# Patient Record
Sex: FEMALE | Race: WHITE | NOT HISPANIC OR LATINO | Employment: UNEMPLOYED | URBAN - METROPOLITAN AREA
[De-identification: names, ages, dates, MRNs, and addresses within clinical notes are randomized per-mention and may not be internally consistent; named-entity substitution may affect disease eponyms.]

---

## 2019-07-25 ENCOUNTER — HOSPITAL ENCOUNTER (OUTPATIENT)
Dept: GENERAL RADIOLOGY | Facility: HOSPITAL | Age: 29
Discharge: HOME | End: 2019-07-25
Attending: OBSTETRICS & GYNECOLOGY

## 2020-02-21 ENCOUNTER — HOSPITAL ENCOUNTER (OUTPATIENT)
Dept: LABOR AND DELIVERY | Facility: HOSPITAL | Age: 30
Discharge: HOME | End: 2020-02-21
Attending: OBSTETRICS & GYNECOLOGY

## 2020-02-22 ENCOUNTER — HOSPITAL ENCOUNTER (OUTPATIENT)
Dept: LABOR AND DELIVERY | Facility: HOSPITAL | Age: 30
Discharge: HOME | End: 2020-02-22
Attending: OBSTETRICS & GYNECOLOGY

## 2020-05-08 ENCOUNTER — HOSPITAL ENCOUNTER (OUTPATIENT)
Dept: ULTRASOUND IMAGING | Facility: HOSPITAL | Age: 30
Discharge: HOME | End: 2020-05-08
Attending: OBSTETRICS & GYNECOLOGY

## 2020-05-15 ENCOUNTER — HOSPITAL ENCOUNTER (OUTPATIENT)
Dept: ULTRASOUND IMAGING | Facility: HOSPITAL | Age: 30
Discharge: HOME | End: 2020-05-15
Attending: OBSTETRICS & GYNECOLOGY

## 2020-05-18 ENCOUNTER — OFFICE VISIT CONVERTED (OUTPATIENT)
Dept: SURGERY | Facility: CLINIC | Age: 30
End: 2020-05-18
Attending: SURGERY

## 2020-05-19 ENCOUNTER — HOSPITAL ENCOUNTER (OUTPATIENT)
Dept: PERIOP | Facility: HOSPITAL | Age: 30
Setting detail: HOSPITAL OUTPATIENT SURGERY
Discharge: HOME | End: 2020-05-19
Attending: SURGERY

## 2020-05-19 LAB — HCG UR QL: NEGATIVE

## 2020-05-21 LAB
BACTERIA SPEC AEROBE CULT: NORMAL
BACTERIA SPEC ANAEROBE CULT: NORMAL

## 2020-05-28 ENCOUNTER — OFFICE VISIT CONVERTED (OUTPATIENT)
Dept: SURGERY | Facility: CLINIC | Age: 30
End: 2020-05-28
Attending: SURGERY

## 2020-06-04 ENCOUNTER — HOSPITAL ENCOUNTER (OUTPATIENT)
Dept: SURGERY | Facility: CLINIC | Age: 30
Discharge: HOME | End: 2020-06-04
Attending: NURSE PRACTITIONER

## 2020-06-04 ENCOUNTER — OFFICE VISIT CONVERTED (OUTPATIENT)
Dept: SURGERY | Facility: CLINIC | Age: 30
End: 2020-06-04
Attending: NURSE PRACTITIONER

## 2020-06-06 LAB — BACTERIA SPEC AEROBE CULT: NORMAL

## 2020-06-11 ENCOUNTER — OFFICE VISIT CONVERTED (OUTPATIENT)
Dept: SURGERY | Facility: CLINIC | Age: 30
End: 2020-06-11
Attending: NURSE PRACTITIONER

## 2020-06-25 ENCOUNTER — OFFICE VISIT CONVERTED (OUTPATIENT)
Dept: SURGERY | Facility: CLINIC | Age: 30
End: 2020-06-25
Attending: NURSE PRACTITIONER

## 2020-06-25 ENCOUNTER — HOSPITAL ENCOUNTER (OUTPATIENT)
Dept: SURGERY | Facility: CLINIC | Age: 30
Discharge: HOME | End: 2020-06-25
Attending: NURSE PRACTITIONER

## 2020-06-29 LAB
BACTERIA SPEC AEROBE CULT: ABNORMAL
CIPROFLOXACIN SUSC ISLT: <=0.5
CLINDAMYCIN SUSC ISLT: 0.25
DAPTOMYCIN SUSC ISLT: 0.5
DOXYCYCLINE SUSC ISLT: <=0.5
ERYTHROMYCIN SUSC ISLT: <=0.25
GENTAMICIN SUSC ISLT: <=0.5
LEVOFLOXACIN SUSC ISLT: <=0.12
OXACILLIN SUSC ISLT: <=0.25
RIFAMPIN SUSC ISLT: <=0.5
TETRACYCLINE SUSC ISLT: <=1
TIGECYCLINE SUSC ISLT: <=0.12
TMP SMX SUSC ISLT: <=10
VANCOMYCIN SUSC ISLT: 1

## 2020-07-02 ENCOUNTER — OFFICE VISIT CONVERTED (OUTPATIENT)
Dept: SURGERY | Facility: CLINIC | Age: 30
End: 2020-07-02
Attending: SURGERY

## 2020-07-16 ENCOUNTER — CONVERSION ENCOUNTER (OUTPATIENT)
Dept: SURGERY | Facility: CLINIC | Age: 30
End: 2020-07-16

## 2020-07-16 ENCOUNTER — OFFICE VISIT CONVERTED (OUTPATIENT)
Dept: SURGERY | Facility: CLINIC | Age: 30
End: 2020-07-16
Attending: SURGERY

## 2020-12-09 LAB
EXTERNAL HEPATITIS B SURFACE ANTIGEN: NEGATIVE
EXTERNAL HEPATITIS C, RNA QUANT PCR: 0
EXTERNAL RUBELLA QUALITATIVE: NORMAL
EXTERNAL SYPHILIS RPR SCREEN: NEGATIVE

## 2021-03-24 LAB — EXTERNAL GTT 1 HOUR: 136

## 2021-05-10 NOTE — H&P
History and Physical      Patient Name: Dominique Thapa   Patient ID: [id number]   Sex: Female   YOB: 1990    Primary Care Provider: No PCP No PCP Other   Referring Provider: Supa Hickey MD    Visit Date: June 25, 2020    Provider: CLARIBEL Morley   Location: [facility]   Location Address: [address] [place] [address]  [id number]   Location Phone: [phone]          Chief Complaint  · Follow Up      History Of Present Illness  Dominique Thapa is a 30 year old /White female who is here to follow up wound check.      Patient presents today for follow-up visit after undergoing an I&D of the right breast on 5/19/2020 performed by Dr. Henrietta Liu for an abscess.  Patient reports that she is still packing her wound.  Is concerned with some greenish-yellowish drainage.  Denies any fever or chills.       Past Medical History  Disease Name Date Onset Notes   *No Pertinent Past Medical History --  --          Past Surgical History  Procedure Name Date Notes   Incision and drainage abscess --  --          Allergy List  Allergen Name Date Reaction Notes   NO KNOWN DRUG ALLERGIES --  --  --        Allergies Reconciled  Family Medical History  Disease Name Relative/Age Notes   Diabetes, unspecified type Grandmother (maternal)/   Grandmother (maternal)   Family history of breast cancer  Aunt/30s         Social History  Finding Status Start/Stop Quantity Notes   Tobacco Never --/-- --  --          Review of Systems  · Constitutional  o Denies  o : chills, fever  · Eyes  o Denies  o : yellowish discoloration of eyes  · HENT  o Denies  o : difficulty swallowing  · Breasts  o Admits  o : tenderness  o Denies  o : abnormal changes in breast size, additional breast symptoms except as noted in the HPI  · Cardiovascular  o Denies  o : chest pain on exertion  · Respiratory  o Denies  o : shortness of breath  · Gastrointestinal  o Denies  o : nausea, vomiting, diarrhea,  "constipation  · Genitourinary  o Denies  o : abnormal color of urine  · Integument  o Denies  o : rash  · Neurologic  o Denies  o : tingling or numbness  · Musculoskeletal  o Denies  o : joint pain  · Endocrine  o Denies  o : weight gain, weight loss      Vitals  Date Time BP Position Site L\R Cuff Size HR RR TEMP (F) WT  HT  BMI kg/m2 BSA m2 O2 Sat        06/25/2020 09:21 AM       12  142lbs 4oz 5'  6\" 22.96 1.73           Physical Examination  · Constitutional  o Appearance  o : well developed, well-nourished, patient in no apparent distress  · Head and Face  o Head  o :   § Inspection  § : atraumatic, normocephalic  o Face  o :   § Inspection  § : no facial lesions  · Eyes  o Conjunctivae  o : conjunctivae normal  o Sclerae  o : sclerae white  · Neck  o Inspection/Palpation  o : normal appearance, no masses or tenderness, trachea midline  · Respiratory  o Respiratory Effort  o : breathing unlabored  · Skin and Subcutaneous Tissue  o General Inspection  o : no lesions present, no areas of discoloration, skin turgor normal, texture normal  · Neurologic  o Mental Status Examination  o :   § Orientation  § : grossly oriented to person, place and time  § Attention  § : attention normal, concentration abilities normal  § Fund of Knowledge  § : fund of knowledge within normal limits, patient aware of current events  o Gait and Station  o : normal gait, able to stand without difficulty  · Psychiatric  o Judgement and Insight  o : judgment and insight intact  o Mood and Affect  o : mood normal, affect appropriate     Right breast: Wound is 1 cm x 1.5 cm in depth.  Granulation tissue noted in the wound bed.  There was purulent drainage on packing that was removed.  Repacked wound today in clinic               Assessment  · Breast abscess     611.0/N61.1  · Encounter for surgical wound dressing change     V58.31/Z48.01    Problems Reconciled  Plan  · Orders  o Wound Culture with Sensitivities if indicated [facility] ([address]) - " 611.0/N61.1, V58.31/Z48.01 - 06/25/2020   right breast  · Medications  o Medications have been Reconciled  o Transition of Care or Provider Policy  · Instructions  o Continue with local wound care.  o follow-up with Dr. Liu in 1 week.  o Electronically Identified Patient Education Materials Provided Electronically  · Disposition  o Call or Return if symptoms worsen or persist.            Electronically Signed by: CLARIBEL Morley -Author on June 25, 2020 05:43:30 PM

## 2021-05-10 NOTE — H&P
History and Physical      Patient Name: Dominique Thapa   Patient ID: [id number]   Sex: Female   YOB: 1990    Primary Care Provider: No PCP No PCP Other   Referring Provider: Supa Hickey MD    Visit Date: July 2, 2020    Provider: Henrietta Liu MD   Location: [facility]   Location Address: [address] [place] [address]  [id number]   Location Phone: [phone]          Chief Complaint  · Follow Up Surgery      History Of Present Illness  Dominique Thapa is a 30 year old /White female who is here today for follow up of: I and D breast abscess.   She is doing well-status post surgery. Wound is almost completely closed.       Past Medical History  *No Pertinent Past Medical History         Past Surgical History  Incision and drainage abscess         Allergy List  NO KNOWN DRUG ALLERGIES       Allergies Reconciled  Family Medical History  Diabetes, unspecified type; Family history of breast cancer         Social History  Tobacco (Never)         Review of Systems  · Constitutional  o Denies  o : fever, chills  · Eyes  o Denies  o : yellowish discoloration of the eyes, eye pain  · HENT  o Denies  o : difficulty swallowing, hoarseness  · Breasts  o * See HPI  · Cardiovascular  o Denies  o : chest pain on exertion, irregular heart beats  · Respiratory  o Denies  o : shortness of breath, cough  · Gastrointestinal  o Denies  o : nausea, vomiting, diarrhea, constipation  · Integument  o Admits  o : see HPI for surgical incision healing  · Neurologic  o Denies  o : tingling or numbness, loss of balance  · Musculoskeletal  o Denies  o : joint pain, back pain  · Endocrine  o Denies  o : weight gain, weight loss  · All Others Negative      Physical Examination  · Constitutional  o Appearance  o : well developed/well nourished patient in no apparent distress  · Respiratory  o Inspection of Chest  o : equal breaths bilaterally  · Gastrointestinal  o Abdominal  Examination  o : soft/nontender, nondistended, no organomegaly appreciated  · Post Surgical Incision  o Surgical wound  o : healing well, no erythema          Assessment  · Postoperative Exam Following Surgery     V67.00/Z09      Plan  · Medications  o Medications have been Reconciled  o Transition of Care or Provider Policy  · Instructions  o Return to office with any issues.  o F/U in 2 weeks.            Electronically Signed by: Henrietta Liu MD -Author on July 2, 2020 08:55:54 AM

## 2021-05-10 NOTE — H&P
History and Physical      Patient Name: Dominique Thapa   Patient ID: [id number]   Sex: Female   YOB: 1990    Primary Care Provider: No PCP No PCP Other   Referring Provider: Supa Hickey MD    Visit Date: June 11, 2020    Provider: CLARIBEL Morley   Location: [facility]   Location Address: [address] [place] [address]  [id number]   Location Phone: [phone]          Chief Complaint  · Follow Up      History Of Present Illness  Dominique Thapa is a 30 year old /White female who is here to follow up status post surgery.      Patient reports today for a follow-up visit for a wound check. Patient underwent an I&D of a right breast abscess with intraoperative ultrasound use on 5/19/2020 performed by Dr. Henrietta Liu. Reports that she is still doing dressing changes twice daily. Denies any fever or chills. Denies any pain.       Past Medical History  Disease Name Date Onset Notes   *No Pertinent Past Medical History --  --          Past Surgical History  Procedure Name Date Notes   Incision and drainage abscess --  --          Allergy List  Allergen Name Date Reaction Notes   NO KNOWN DRUG ALLERGIES --  --  --        Allergies Reconciled  Family Medical History  Disease Name Relative/Age Notes   Diabetes, unspecified type Grandmother (maternal)/   Grandmother (maternal)   Family history of breast cancer  Aunt/30s         Social History  Finding Status Start/Stop Quantity Notes   Tobacco Never --/-- --  --          Review of Systems  · Constitutional  o Denies  o : chills, fever  · Eyes  o Denies  o : yellowish discoloration of eyes  · HENT  o Denies  o : difficulty swallowing  · Breasts  o Admits  o : tenderness, additional breast symptoms except as noted in the HPI  o Denies  o : abnormal changes in breast size  · Cardiovascular  o Denies  o : chest pain on exertion  · Respiratory  o Denies  o : shortness of breath  · Gastrointestinal  o Denies  o :  "nausea, vomiting, diarrhea, constipation  · Genitourinary  o Denies  o : abnormal color of urine  · Integument  o Denies  o : rash  · Neurologic  o Denies  o : tingling or numbness  · Musculoskeletal  o Denies  o : joint pain  · Endocrine  o Denies  o : weight gain, weight loss      Vitals  Date Time BP Position Site L\R Cuff Size HR RR TEMP (F) WT  HT  BMI kg/m2 BSA m2 O2 Sat        06/11/2020 11:38 AM       12  143lbs 8oz 5'  6\" 23.16 1.74           Physical Examination  · Constitutional  o Appearance  o : well developed, well-nourished, patient in no apparent distress  · Head and Face  o Head  o :   § Inspection  § : atraumatic, normocephalic  o Face  o :   § Inspection  § : no facial lesions  · Eyes  o Conjunctivae  o : conjunctivae normal  o Sclerae  o : sclerae white  · Neck  o Inspection/Palpation  o : normal appearance, no masses or tenderness, trachea midline  · Respiratory  o Respiratory Effort  o : breathing unlabored  · Skin and Subcutaneous Tissue  o General Inspection  o : no lesions present, no areas of discoloration, skin turgor normal, texture normal  · Neurologic  o Mental Status Examination  o :   § Orientation  § : grossly oriented to person, place and time  § Attention  § : attention normal, concentration abilities normal  § Fund of Knowledge  § : fund of knowledge within normal limits, patient aware of current events  o Gait and Station  o : normal gait, able to stand without difficulty  · Psychiatric  o Judgement and Insight  o : judgment and insight intact  o Mood and Affect  o : mood normal, affect appropriate     Right breast: 1 cm x 1.5 cm depth wound. Granulation tissue noted in the wound bed.  Repacked today in clinic.           Assessment  · Abscess     682.9/L02.91  · Wound check, abscess     V58.89/Z51.89      Plan  · Medications  o Medications have been Reconciled  o Transition of Care or Provider Policy  · Instructions  o Follow up in 2 weeks.  o Continue with local wound " care.  o Electronically Identified Patient Education Materials Provided Electronically  · Disposition  o Call or Return if symptoms worsen or persist.            Electronically Signed by: CLARIBEL Morley -Author on June 11, 2020 07:36:25 PM

## 2021-05-10 NOTE — H&P
History and Physical      Patient Name: Dominique Thapa   Patient ID: [id number]   Sex: Female   YOB: 1990    Primary Care Provider: No PCP No PCP Other   Referring Provider: Supa Hickey MD    Visit Date: May 18, 2020    Provider: Henrietta Liu MD   Location: [facility]   Location Address: [address] [place] [address]  [id number]   Location Phone: [phone]          Chief Complaint  · Rigth breast abscess sp multiple attempted drainages  · Pre-Surgical History and Physical Examination for [facility]  · Consents for Surgery      History Of Present Illness  Dominique Thapa is a 30 year old /White female who is referred from Supa Hickey MD ; she had a baby in Feb and has been breast feeding and then developed a right breast abscess that has been treated with multiple drainages and has recurred every time over the days following the draianges. She would like to have it I and D'd in OR and does not want to attempt an additional needle drainage. The most recent ultrasound shows a 7.3 cm x 5.4 cm loculated fluid collection in the 10 oclock position about 6 cm FTN.             Past Medical History  *No Pertinent Past Medical History         Past Surgical History  *I have had no surgeries         Allergy List  NO KNOWN DRUG ALLERGIES       Allergies Reconciled  Family Medical History  Diabetes, unspecified type; Family history of breast cancer         Social History  Tobacco (Never)         Review of Systems  · Constitutional  o Denies  o : fever, chills  · Breasts  o * See HPI  · Cardiovascular  o Denies  o : chest pain, cardiac murmurs, irregular heart beats, dyspnea on exertion  · Integument  o Denies  o : rash, itching, new skin lesions  · Heme-Lymph  o Denies  o : easy bleeding, easy bruising, lymph node enlargement or tenderness      Vitals  Date Time BP Position Site L\R Cuff Size HR RR TEMP (F) WT  HT  BMI kg/m2 BSA m2 O2  "[facility]       05/18/2020 09:35 AM       12  144lbs 0oz 5'  6\" 23.24 1.74           Physical Examination  · Constitutional  o Appearance  o : A well-nourished, well-developed patient who ambulates without difficulty, Alert and Oriented X3.  · Respiratory  o Respiratory Effort  o : breathing unlabored  o Inspection of Chest  o : breaths equal bilaterally  · Breasts  o Inspection of Breasts  o : developmental state normal for age  o Palpation of Breasts, Axillae  o : no masses or tenderness noted on palpation on left, right breast is erythematous with induration          Assessment  · Breast abscess     611.0/N61.1  · Preoperative Examination     V72.83      Plan  · Orders  o General Surgery Order (GEN[facility]) - - 05/18/2020  o General Surgery Order (GENOR) - 611.0/N61.1 - 05/19/2020  · Medications  o Medications have been Reconciled  o Transition of Care or Provider Policy  · Instructions  o PLAN:  o Schedule operation  o Handouts Provided-Pre-Procedure Instructions including date and time and location of procedure.  o ****Surgical Orders****  o ****Patient Status****  o RISK AND BENEFITS:  o Consent for surgery: Given these options, the patient has verbally expressed an understanding of the risks of surgery and finds these risks acceptable. We will proceed with surgery as soon as possible.  o Possible risks, complications, benefits, and alternatives to surgical or invasive procedure have been explained to patient and/or legal guardian.  o O.R. PREP: Per protocol  o IV: Per Anesthesia  o Please sign permit for: Incision and drainage of right breast abscess  o Vancomycin 1 gram IV on call to OR.  o The above History and Physical Examination has been completed within 30 days of admission.            Electronically Signed by: Henrietta Liu MD -Author on May 18, 2020 09:52:16 AM  "

## 2021-05-10 NOTE — H&P
History and Physical      Patient Name: Dominique Thapa   Patient ID: [id number]   Sex: Female   YOB: 1990    Primary Care Provider: No PCP No PCP Other   Referring Provider: Supa Hickey MD    Visit Date: June 4, 2020    Provider: CLARIBEL Morley   Location: [facility]   Location Address: [address] [place] [address]  [id number]   Location Phone: [phone]          Chief Complaint  · Follow Up      History Of Present Illness  Dominique Thapa is a 30 year old /White female who is here to follow up status post surgery.      Patient presents today for follow-up visit after undergoing an I&D of a right breast abscess with intraoperative ultrasound use on 5/19/2020 performed by Dr. Henrietta Liu.  Culture was performed during surgery that revealed no pathogen growth after 2 days.  patient denies any fever or chills.  Patient reports her  has doing been doing dressing changes.  Denies any postoperative concerns.       Past Medical History  Disease Name Date Onset Notes   *No Pertinent Past Medical History --  --          Past Surgical History  Procedure Name Date Notes   *I have had no surgeries --  --    Incision and drainage abscess --  --          Allergy List  Allergen Name Date Reaction Notes   NO KNOWN DRUG ALLERGIES --  --  --        Allergies Reconciled  Family Medical History  Disease Name Relative/Age Notes   Diabetes, unspecified type Grandmother (maternal)/   Grandmother (maternal)   Family history of breast cancer  Aunt/30s         Social History  Finding Status Start/Stop Quantity Notes   Tobacco Never --/-- --  --          Review of Systems  · Constitutional  o Denies  o : chills, fever  · Eyes  o Denies  o : yellowish discoloration of eyes  · HENT  o Denies  o : difficulty swallowing  · Breasts  o Admits  o : tenderness, additional breast symptoms except as noted in the HPI  o Denies  o : abnormal changes in breast  "size  · Cardiovascular  o Denies  o : chest pain on exertion  · Respiratory  o Denies  o : shortness of breath  · Gastrointestinal  o Denies  o : nausea, vomiting, diarrhea, constipation  · Genitourinary  o Denies  o : abnormal color of urine  · Integument  o Denies  o : rash  · Neurologic  o Denies  o : tingling or numbness  · Musculoskeletal  o Denies  o : joint pain  · Endocrine  o Denies  o : weight gain, weight loss      Vitals  Date Time BP Position Site L\R Cuff Size HR RR TEMP (F) WT  HT  BMI kg/m2 BSA m2 O2 Sat HC       06/04/2020 09:30 AM       16  141lbs 0oz 5'  6\" 22.76 1.73           Physical Examination  · Constitutional  o Appearance  o : well developed, well-nourished, patient in no apparent distress  · Head and Face  o Head  o :   § Inspection  § : atraumatic, normocephalic  o Face  o :   § Inspection  § : no facial lesions  · Eyes  o Conjunctivae  o : conjunctivae normal  o Sclerae  o : sclerae white  · Neck  o Inspection/Palpation  o : normal appearance, no masses or tenderness, trachea midline  · Respiratory  o Respiratory Effort  o : breathing unlabored  · Skin and Subcutaneous Tissue  o General Inspection  o : no lesions present, no areas of discoloration, skin turgor normal, texture normal  · Neurologic  o Mental Status Examination  o :   § Orientation  § : grossly oriented to person, place and time  § Attention  § : attention normal, concentration abilities normal  § Fund of Knowledge  § : fund of knowledge within normal limits, patient aware of current events  o Gait and Station  o : normal gait, able to stand without difficulty  · Psychiatric  o Judgement and Insight  o : judgment and insight intact  o Mood and Affect  o : mood normal, affect appropriate     Right breast: 1 cm x 3 cm depth wound with some purulent drainage present on dressing.  Granulation tissue noted in the wound bed.  Repacked today in clinic.  Wound culture performed today.           Assessment  · Postoperative Exam " Following Surgery     V67.00/Z09  · Abscess     682.9/L02.91      Plan  · Orders  o Wound Culture with Sensitivities if indicated [facility] ([id number]) - V67.00/Z09, 682.9/L02.91 - 06/04/2020   right breast  · Medications  o Medications have been Reconciled  o Transition of Care or Provider Policy  · Instructions  o Wound culture.  o Continue with local wound care.  o Follow-up with me in 1 week.  o Electronically Identified Patient Education Materials Provided Electronically  · Disposition  o Call or Return if symptoms worsen or persist.            Electronically Signed by: CLARIBEL Morley -Author on June 4, 2020 08:43:06 PM

## 2021-05-10 NOTE — H&P
History and Physical      Patient Name: Dominique Thapa   Patient ID: [id number]   Sex: Female   YOB: 1990    Primary Care Provider: No PCP No PCP Other   Referring Provider: Supa Hickey MD    Visit Date: May 28, 2020    Provider: Henrietta Liu MD   Location[facility]   Location Address: [address] [place] [address]  [id number]   Location Phone: [phone]          Chief Complaint  · Follow Up Surgery      History Of Present Illness  Dominique Thapa is a 30 year old /White female who is here today for follow up of: I and D of breast abscess.   She is doing well-status post surgery. Culture with no growth at 2 days.       Past Medical History  *No Pertinent Past Medical History         Past Surgical History  *I have had no surgeries; Incision and drainage abscess         Medication List  Bactrim -160 mg oral tablet         Allergy List  NO KNOWN DRUG ALLERGIES       Allergies Reconciled  Family Medical History  Diabetes, unspecified type; Family history of breast cancer         Social History  Tobacco (Never)         Review of Systems  · Constitutional  o Denies  o : fever, chills  · Eyes  o Denies  o : yellowish discoloration of the eyes, eye pain  · HENT  o Denies  o : difficulty swallowing, hoarseness  · Breasts  o * See HPI  · Cardiovascular  o Denies  o : chest pain on exertion, irregular heart beats  · Respiratory  o Denies  o : shortness of breath, cough  · Gastrointestinal  o Denies  o : nausea, vomiting, diarrhea, constipation  · Integument  o Admits  o : see HPI for surgical incision healing  · Neurologic  o Denies  o : tingling or numbness, loss of balance  · Musculoskeletal  o Denies  o : joint pain, back pain  · Endocrine  o Denies  o : weight gain, weight loss  · All Others Negative      Vitals  Date Time BP Position Site L\R Cuff Size HR RR TEMP (F) WT  HT  BMI kg/m2 BSA m2 O2 Sat HC       05/28/2020 08:19 AM       16   "143lbs 0oz 5'  6\" 23.08 1.74           Physical Examination  · Constitutional  o Appearance  o : well developed/well nourished patient in no apparent distress  · Respiratory  o Inspection of Chest  o : equal breaths bilaterally  · Gastrointestinal  o Abdominal Examination  o : soft/nontender, nondistended, no organomegaly appreciated  · Post Surgical Incision  o Surgical wound  o : healing well, no erythema, packed with gauze          Assessment  · Postoperative Exam Following Surgery     V67.00/Z09      Plan  · Medications  o Medications have been Reconciled  o Transition of Care or Provider Policy  · Instructions  o Continue local wound care.  o Return to office with any issues.  o F/U in 1 week with Gema SANFORD            Electronically Signed by: Henrietta Liu MD -Author on May 28, 2020 08:37:14 AM  "

## 2021-05-10 NOTE — H&P
"   History and Physical      Patient Name: Dominique Thapa   Patient ID: [id number]   Sex: Female   YOB: 1990    Primary Care Provider: No PCP No PCP Other   Referring Provider: Supa Hickey MD    Visit Date: July 16, 2020    Provider: Henrietta Liu MD   Location[facility]   Location Address: [address] [place] [address]  [id number]   Location Phone: [phone]          Chief Complaint  · Follow Up Surgery      History Of Present Illness  Dominique Thapa is a 30 year old /White female who is here today for follow up of: I and D abscess right breast.   She is doing well-status post surgery. Her wound is 98% better and has a pinpoint open area still.       Past Medical History  *No Pertinent Past Medical History         Past Surgical History  Incision and drainage abscess         Allergy List  NO KNOWN DRUG ALLERGIES       Allergies Reconciled  Family Medical History  Diabetes, unspecified type; Family history of breast cancer         Social History  Tobacco (Never)         Review of Systems  · Constitutional  o Denies  o : fever, chills  · Eyes  o Denies  o : yellowish discoloration of the eyes, eye pain  · HENT  o Denies  o : difficulty swallowing, hoarseness  · Breasts  o * See HPI  · Cardiovascular  o Denies  o : chest pain on exertion, irregular heart beats  · Respiratory  o Denies  o : shortness of breath, cough  · Gastrointestinal  o Denies  o : nausea, vomiting, diarrhea, constipation  · Integument  o Admits  o : see HPI for surgical incision healing  · Neurologic  o Denies  o : tingling or numbness, loss of balance  · Musculoskeletal  o Denies  o : joint pain, back pain  · Endocrine  o Denies  o : weight gain, weight loss  · All Others Negative      Vitals  Date Time BP Position Site L\R Cuff Size HR RR TEMP (F) WT  HT  BMI kg/m2 BSA m2 O2 Sat        07/16/2020 09:09 AM       16  142lbs 0oz 5'  6\" 22.92 1.73           Physical " Examination  · Constitutional  o Appearance  o : well developed/well nourished patient in no apparent distress  · Respiratory  o Inspection of Chest  o : equal breaths bilaterally  · Gastrointestinal  o Abdominal Examination  o : soft/nontender, nondistended, no organomegaly appreciated  · Post Surgical Incision  o Surgical wound  o : healing well, no erythema, pinpoint area open           Assessment  · Postoperative Exam Following Surgery     V67.00/Z09      Plan  · Medications  o Medications have been Reconciled  o Transition of Care or Provider Policy  · Instructions  o Return to office with any issues.  o F/U PRN            Electronically Signed by: Henrietta Liu MD -Author on July 16, 2020 09:13:56 AM

## 2021-05-15 VITALS — BODY MASS INDEX: 22.98 KG/M2 | RESPIRATION RATE: 16 BRPM | WEIGHT: 143 LBS | HEIGHT: 66 IN

## 2021-05-15 VITALS — WEIGHT: 142.25 LBS | HEIGHT: 66 IN | BODY MASS INDEX: 22.86 KG/M2 | RESPIRATION RATE: 12 BRPM

## 2021-05-15 VITALS — WEIGHT: 144 LBS | BODY MASS INDEX: 23.14 KG/M2 | HEIGHT: 66 IN | RESPIRATION RATE: 12 BRPM

## 2021-05-15 VITALS — RESPIRATION RATE: 16 BRPM | WEIGHT: 142 LBS | HEIGHT: 66 IN | BODY MASS INDEX: 22.82 KG/M2

## 2021-05-15 VITALS — BODY MASS INDEX: 23.06 KG/M2 | HEIGHT: 66 IN | WEIGHT: 143.5 LBS | RESPIRATION RATE: 12 BRPM

## 2021-05-15 VITALS — BODY MASS INDEX: 22.66 KG/M2 | RESPIRATION RATE: 16 BRPM | HEIGHT: 66 IN | WEIGHT: 141 LBS

## 2021-06-21 LAB — EXTERNAL GROUP B STREP ANTIGEN: NORMAL

## 2021-07-11 ENCOUNTER — HOSPITAL ENCOUNTER (INPATIENT)
Facility: HOSPITAL | Age: 31
LOS: 1 days | Discharge: HOME | End: 2021-07-12
Attending: OBSTETRICS & GYNECOLOGY | Admitting: OBSTETRICS & GYNECOLOGY

## 2021-07-11 PROBLEM — Z37.9 NORMAL LABOR: Status: ACTIVE | Noted: 2021-07-11

## 2021-07-11 LAB
ABO GROUP BLD: NORMAL
ABO GROUP BLD: NORMAL
BASE EXCESS BLDCOA CALC-SCNC: -10 MMOL/L
BASE EXCESS BLDCOV CALC-SCNC: -7.9 MMOL/L
BLD GP AB SCN SERPL QL: NEGATIVE
DEPRECATED RDW RBC AUTO: 42.6 FL (ref 37–54)
ERYTHROCYTE [DISTWIDTH] IN BLOOD BY AUTOMATED COUNT: 14.7 % (ref 12.3–15.4)
HCO3 BLDCOA-SCNC: 20.8 MMOL/L
HCO3 BLDCOV-SCNC: 17.8 MMOL/L
HCT VFR BLD AUTO: 34 % (ref 34–46.6)
HGB BLD-MCNC: 10.8 G/DL (ref 12–15.9)
MCH RBC QN AUTO: 25.7 PG (ref 26.6–33)
MCHC RBC AUTO-ENTMCNC: 31.8 G/DL (ref 31.5–35.7)
MCV RBC AUTO: 80.8 FL (ref 79–97)
PCO2 BLDCOA: 68.4 MMHG (ref 33–49)
PCO2 BLDCOV: 37.5 MM HG (ref 28–40)
PH BLDCOA: 7.1 PH UNITS (ref 7.21–7.31)
PH BLDCOV: 7.29 PH UNITS (ref 7.31–7.37)
PLATELET # BLD AUTO: 186 10*3/MM3 (ref 140–450)
PMV BLD AUTO: 12.7 FL (ref 6–12)
PO2 BLDCOA: <40.5 MMHG
PO2 BLDCOV: <40.5 MM HG (ref 21–31)
RBC # BLD AUTO: 4.21 10*6/MM3 (ref 3.77–5.28)
RH BLD: POSITIVE
RH BLD: POSITIVE
SARS-COV-2 RNA RESP QL NAA+PROBE: NOT DETECTED
T&S EXPIRATION DATE: NORMAL
WBC # BLD AUTO: 12.06 10*3/MM3 (ref 3.4–10.8)

## 2021-07-11 PROCEDURE — 86901 BLOOD TYPING SEROLOGIC RH(D): CPT | Performed by: OBSTETRICS & GYNECOLOGY

## 2021-07-11 PROCEDURE — U0003 INFECTIOUS AGENT DETECTION BY NUCLEIC ACID (DNA OR RNA); SEVERE ACUTE RESPIRATORY SYNDROME CORONAVIRUS 2 (SARS-COV-2) (CORONAVIRUS DISEASE [COVID-19]), AMPLIFIED PROBE TECHNIQUE, MAKING USE OF HIGH THROUGHPUT TECHNOLOGIES AS DESCRIBED BY CMS-2020-01-R: HCPCS | Performed by: OBSTETRICS & GYNECOLOGY

## 2021-07-11 PROCEDURE — 86900 BLOOD TYPING SEROLOGIC ABO: CPT | Performed by: OBSTETRICS & GYNECOLOGY

## 2021-07-11 PROCEDURE — 82803 BLOOD GASES ANY COMBINATION: CPT | Performed by: OBSTETRICS & GYNECOLOGY

## 2021-07-11 PROCEDURE — 51702 INSERT TEMP BLADDER CATH: CPT

## 2021-07-11 PROCEDURE — 86901 BLOOD TYPING SEROLOGIC RH(D): CPT

## 2021-07-11 PROCEDURE — 0KQM0ZZ REPAIR PERINEUM MUSCLE, OPEN APPROACH: ICD-10-PCS | Performed by: OBSTETRICS & GYNECOLOGY

## 2021-07-11 PROCEDURE — 86850 RBC ANTIBODY SCREEN: CPT | Performed by: OBSTETRICS & GYNECOLOGY

## 2021-07-11 PROCEDURE — 25010000002 FENTANYL CITRATE (PF) 50 MCG/ML SOLUTION: Performed by: OBSTETRICS & GYNECOLOGY

## 2021-07-11 PROCEDURE — 10907ZC DRAINAGE OF AMNIOTIC FLUID, THERAPEUTIC FROM PRODUCTS OF CONCEPTION, VIA NATURAL OR ARTIFICIAL OPENING: ICD-10-PCS | Performed by: OBSTETRICS & GYNECOLOGY

## 2021-07-11 PROCEDURE — 25010000002 PENICILLIN G POTASSIUM PER 600000 UNITS: Performed by: OBSTETRICS & GYNECOLOGY

## 2021-07-11 PROCEDURE — 86900 BLOOD TYPING SEROLOGIC ABO: CPT

## 2021-07-11 PROCEDURE — 85027 COMPLETE CBC AUTOMATED: CPT | Performed by: OBSTETRICS & GYNECOLOGY

## 2021-07-11 RX ORDER — ONDANSETRON 2 MG/ML
4 INJECTION INTRAMUSCULAR; INTRAVENOUS EVERY 6 HOURS PRN
Status: DISCONTINUED | OUTPATIENT
Start: 2021-07-11 | End: 2021-07-11

## 2021-07-11 RX ORDER — CARBOPROST TROMETHAMINE 250 UG/ML
250 INJECTION, SOLUTION INTRAMUSCULAR ONCE AS NEEDED
Status: DISCONTINUED | OUTPATIENT
Start: 2021-07-11 | End: 2021-07-11

## 2021-07-11 RX ORDER — DIPHENHYDRAMINE HCL 25 MG
25 CAPSULE ORAL EVERY 6 HOURS PRN
Status: DISCONTINUED | OUTPATIENT
Start: 2021-07-11 | End: 2021-07-11

## 2021-07-11 RX ORDER — SODIUM CHLORIDE 0.9 % (FLUSH) 0.9 %
10 SYRINGE (ML) INJECTION AS NEEDED
Status: DISCONTINUED | OUTPATIENT
Start: 2021-07-11 | End: 2021-07-11

## 2021-07-11 RX ORDER — MORPHINE SULFATE 5 MG/ML
5 INJECTION, SOLUTION INTRAMUSCULAR; INTRAVENOUS
Status: DISCONTINUED | OUTPATIENT
Start: 2021-07-11 | End: 2021-07-11

## 2021-07-11 RX ORDER — FENTANYL CITRATE 50 UG/ML
25 INJECTION, SOLUTION INTRAMUSCULAR; INTRAVENOUS
Status: DISCONTINUED | OUTPATIENT
Start: 2021-07-11 | End: 2021-07-11

## 2021-07-11 RX ORDER — CARBOPROST TROMETHAMINE 250 UG/ML
250 INJECTION, SOLUTION INTRAMUSCULAR ONCE AS NEEDED
Status: DISCONTINUED | OUTPATIENT
Start: 2021-07-11 | End: 2021-07-12 | Stop reason: HOSPADM

## 2021-07-11 RX ORDER — BISACODYL 10 MG
10 SUPPOSITORY, RECTAL RECTAL DAILY PRN
Status: DISCONTINUED | OUTPATIENT
Start: 2021-07-12 | End: 2021-07-12 | Stop reason: HOSPADM

## 2021-07-11 RX ORDER — LIDOCAINE HYDROCHLORIDE 10 MG/ML
INJECTION, SOLUTION EPIDURAL; INFILTRATION; INTRACAUDAL; PERINEURAL
Status: COMPLETED
Start: 2021-07-11 | End: 2021-07-11

## 2021-07-11 RX ORDER — FAMOTIDINE 10 MG/ML
20 INJECTION, SOLUTION INTRAVENOUS 2 TIMES DAILY PRN
Status: DISCONTINUED | OUTPATIENT
Start: 2021-07-11 | End: 2021-07-11

## 2021-07-11 RX ORDER — SODIUM CHLORIDE 0.9 % (FLUSH) 0.9 %
3 SYRINGE (ML) INJECTION EVERY 12 HOURS SCHEDULED
Status: DISCONTINUED | OUTPATIENT
Start: 2021-07-11 | End: 2021-07-11

## 2021-07-11 RX ORDER — FAMOTIDINE 10 MG/ML
20 INJECTION, SOLUTION INTRAVENOUS ONCE AS NEEDED
Status: DISCONTINUED | OUTPATIENT
Start: 2021-07-11 | End: 2021-07-11

## 2021-07-11 RX ORDER — PROMETHAZINE HYDROCHLORIDE 25 MG/1
25 TABLET ORAL EVERY 6 HOURS PRN
Status: DISCONTINUED | OUTPATIENT
Start: 2021-07-11 | End: 2021-07-11

## 2021-07-11 RX ORDER — LIDOCAINE HYDROCHLORIDE 10 MG/ML
30 INJECTION, SOLUTION INFILTRATION; PERINEURAL ONCE
Status: COMPLETED | OUTPATIENT
Start: 2021-07-11 | End: 2021-07-11

## 2021-07-11 RX ORDER — MISOPROSTOL 200 UG/1
800 TABLET ORAL AS NEEDED
Status: DISCONTINUED | OUTPATIENT
Start: 2021-07-11 | End: 2021-07-11

## 2021-07-11 RX ORDER — DOCUSATE SODIUM 100 MG/1
100 CAPSULE, LIQUID FILLED ORAL DAILY
Status: DISCONTINUED | OUTPATIENT
Start: 2021-07-11 | End: 2021-07-12 | Stop reason: HOSPADM

## 2021-07-11 RX ORDER — ACETAMINOPHEN 325 MG/1
650 TABLET ORAL EVERY 4 HOURS PRN
Status: DISCONTINUED | OUTPATIENT
Start: 2021-07-11 | End: 2021-07-11

## 2021-07-11 RX ORDER — PRENATAL VIT NO.126/IRON/FOLIC 28MG-0.8MG
1 TABLET ORAL DAILY
COMMUNITY
End: 2022-03-10

## 2021-07-11 RX ORDER — DIPHENHYDRAMINE HYDROCHLORIDE 50 MG/ML
25 INJECTION INTRAMUSCULAR; INTRAVENOUS EVERY 6 HOURS PRN
Status: DISCONTINUED | OUTPATIENT
Start: 2021-07-11 | End: 2021-07-11

## 2021-07-11 RX ORDER — MAGNESIUM CARB/ALUMINUM HYDROX 105-160MG
30 TABLET,CHEWABLE ORAL ONCE
Status: COMPLETED | OUTPATIENT
Start: 2021-07-11 | End: 2021-07-11

## 2021-07-11 RX ORDER — SODIUM CHLORIDE, SODIUM LACTATE, POTASSIUM CHLORIDE, CALCIUM CHLORIDE 600; 310; 30; 20 MG/100ML; MG/100ML; MG/100ML; MG/100ML
150 INJECTION, SOLUTION INTRAVENOUS CONTINUOUS
Status: DISCONTINUED | OUTPATIENT
Start: 2021-07-11 | End: 2021-07-12 | Stop reason: HOSPADM

## 2021-07-11 RX ORDER — HYDROCODONE BITARTRATE AND ACETAMINOPHEN 5; 325 MG/1; MG/1
1 TABLET ORAL EVERY 4 HOURS PRN
Status: DISCONTINUED | OUTPATIENT
Start: 2021-07-11 | End: 2021-07-12 | Stop reason: HOSPADM

## 2021-07-11 RX ORDER — HYDROCODONE BITARTRATE AND ACETAMINOPHEN 5; 325 MG/1; MG/1
1 TABLET ORAL EVERY 4 HOURS PRN
Status: DISCONTINUED | OUTPATIENT
Start: 2021-07-11 | End: 2021-07-11

## 2021-07-11 RX ORDER — IBUPROFEN 800 MG/1
800 TABLET ORAL EVERY 8 HOURS SCHEDULED
Status: DISCONTINUED | OUTPATIENT
Start: 2021-07-11 | End: 2021-07-12 | Stop reason: HOSPADM

## 2021-07-11 RX ORDER — ACETAMINOPHEN 325 MG/1
650 TABLET ORAL EVERY 6 HOURS
Status: DISCONTINUED | OUTPATIENT
Start: 2021-07-11 | End: 2021-07-11

## 2021-07-11 RX ORDER — PROMETHAZINE HYDROCHLORIDE 12.5 MG/1
12.5 TABLET ORAL EVERY 6 HOURS PRN
Status: DISCONTINUED | OUTPATIENT
Start: 2021-07-11 | End: 2021-07-11

## 2021-07-11 RX ORDER — HYDROCODONE BITARTRATE AND ACETAMINOPHEN 10; 325 MG/1; MG/1
1 TABLET ORAL EVERY 4 HOURS PRN
Status: DISCONTINUED | OUTPATIENT
Start: 2021-07-11 | End: 2021-07-11

## 2021-07-11 RX ORDER — CEFAZOLIN SODIUM 2 G/100ML
2 INJECTION, SOLUTION INTRAVENOUS ONCE AS NEEDED
Status: DISCONTINUED | OUTPATIENT
Start: 2021-07-11 | End: 2021-07-11

## 2021-07-11 RX ORDER — ONDANSETRON 4 MG/1
4 TABLET, FILM COATED ORAL EVERY 6 HOURS PRN
Status: DISCONTINUED | OUTPATIENT
Start: 2021-07-11 | End: 2021-07-11

## 2021-07-11 RX ORDER — ONDANSETRON 4 MG/1
4 TABLET, FILM COATED ORAL EVERY 8 HOURS PRN
Status: DISCONTINUED | OUTPATIENT
Start: 2021-07-11 | End: 2021-07-12 | Stop reason: HOSPADM

## 2021-07-11 RX ORDER — LIDOCAINE HYDROCHLORIDE 10 MG/ML
5 INJECTION, SOLUTION EPIDURAL; INFILTRATION; INTRACAUDAL; PERINEURAL AS NEEDED
Status: DISCONTINUED | OUTPATIENT
Start: 2021-07-11 | End: 2021-07-11

## 2021-07-11 RX ORDER — HYDROCODONE BITARTRATE AND ACETAMINOPHEN 10; 325 MG/1; MG/1
1 TABLET ORAL EVERY 4 HOURS PRN
Status: DISCONTINUED | OUTPATIENT
Start: 2021-07-11 | End: 2021-07-12 | Stop reason: HOSPADM

## 2021-07-11 RX ORDER — TRISODIUM CITRATE DIHYDRATE AND CITRIC ACID MONOHYDRATE 500; 334 MG/5ML; MG/5ML
30 SOLUTION ORAL ONCE AS NEEDED
Status: DISCONTINUED | OUTPATIENT
Start: 2021-07-11 | End: 2021-07-11

## 2021-07-11 RX ORDER — ACETAMINOPHEN 325 MG/1
650 TABLET ORAL EVERY 6 HOURS
Status: DISCONTINUED | OUTPATIENT
Start: 2021-07-11 | End: 2021-07-12 | Stop reason: HOSPADM

## 2021-07-11 RX ORDER — FAMOTIDINE 20 MG/1
20 TABLET, FILM COATED ORAL ONCE AS NEEDED
Status: DISCONTINUED | OUTPATIENT
Start: 2021-07-11 | End: 2021-07-11

## 2021-07-11 RX ORDER — METHYLERGONOVINE MALEATE 0.2 MG/ML
200 INJECTION INTRAVENOUS ONCE AS NEEDED
Status: DISCONTINUED | OUTPATIENT
Start: 2021-07-11 | End: 2021-07-12 | Stop reason: HOSPADM

## 2021-07-11 RX ORDER — TERBUTALINE SULFATE 1 MG/ML
0.25 INJECTION, SOLUTION SUBCUTANEOUS AS NEEDED
Status: DISCONTINUED | OUTPATIENT
Start: 2021-07-11 | End: 2021-07-11

## 2021-07-11 RX ORDER — FAMOTIDINE 20 MG/1
20 TABLET, FILM COATED ORAL 2 TIMES DAILY PRN
Status: DISCONTINUED | OUTPATIENT
Start: 2021-07-11 | End: 2021-07-11

## 2021-07-11 RX ORDER — SODIUM CHLORIDE 0.9 % (FLUSH) 0.9 %
1-10 SYRINGE (ML) INJECTION AS NEEDED
Status: DISCONTINUED | OUTPATIENT
Start: 2021-07-11 | End: 2021-07-12 | Stop reason: HOSPADM

## 2021-07-11 RX ORDER — MISOPROSTOL 200 UG/1
600 TABLET ORAL ONCE AS NEEDED
Status: DISCONTINUED | OUTPATIENT
Start: 2021-07-11 | End: 2021-07-12 | Stop reason: HOSPADM

## 2021-07-11 RX ORDER — PROMETHAZINE HYDROCHLORIDE 12.5 MG/1
12.5 TABLET ORAL EVERY 4 HOURS PRN
Status: DISCONTINUED | OUTPATIENT
Start: 2021-07-11 | End: 2021-07-12 | Stop reason: HOSPADM

## 2021-07-11 RX ORDER — IBUPROFEN 800 MG/1
800 TABLET ORAL EVERY 8 HOURS SCHEDULED
Status: DISCONTINUED | OUTPATIENT
Start: 2021-07-11 | End: 2021-07-11

## 2021-07-11 RX ORDER — METHYLERGONOVINE MALEATE 0.2 MG/ML
200 INJECTION INTRAVENOUS ONCE AS NEEDED
Status: DISCONTINUED | OUTPATIENT
Start: 2021-07-11 | End: 2021-07-11

## 2021-07-11 RX ORDER — CALCIUM CARBONATE 200(500)MG
2 TABLET,CHEWABLE ORAL 3 TIMES DAILY PRN
Status: DISCONTINUED | OUTPATIENT
Start: 2021-07-11 | End: 2021-07-12 | Stop reason: HOSPADM

## 2021-07-11 RX ORDER — SODIUM CHLORIDE, SODIUM LACTATE, POTASSIUM CHLORIDE, CALCIUM CHLORIDE 600; 310; 30; 20 MG/100ML; MG/100ML; MG/100ML; MG/100ML
150 INJECTION, SOLUTION INTRAVENOUS CONTINUOUS
Status: DISCONTINUED | OUTPATIENT
Start: 2021-07-11 | End: 2021-07-11

## 2021-07-11 RX ORDER — OXYTOCIN-SODIUM CHLORIDE 0.9% IV SOLN 30 UNIT/500ML 30-0.9/5 UT/ML-%
125 SOLUTION INTRAVENOUS ONCE
Status: DISCONTINUED | OUTPATIENT
Start: 2021-07-11 | End: 2021-07-11

## 2021-07-11 RX ADMIN — Medication: at 18:13

## 2021-07-11 RX ADMIN — DOCUSATE SODIUM 100 MG: 100 CAPSULE, LIQUID FILLED ORAL at 22:07

## 2021-07-11 RX ADMIN — PENICILLIN G POTASSIUM 5 MILLION UNITS: 5000000 INJECTION, POWDER, FOR SOLUTION INTRAMUSCULAR; INTRAVENOUS at 11:07

## 2021-07-11 RX ADMIN — OXYTOCIN 125 ML/HR: 10 INJECTION INTRAVENOUS at 14:57

## 2021-07-11 RX ADMIN — ACETAMINOPHEN 650 MG: 325 TABLET ORAL at 18:13

## 2021-07-11 RX ADMIN — FENTANYL CITRATE 25 MCG: 50 INJECTION, SOLUTION INTRAMUSCULAR; INTRAVENOUS at 15:08

## 2021-07-11 RX ADMIN — SODIUM CHLORIDE, POTASSIUM CHLORIDE, SODIUM LACTATE AND CALCIUM CHLORIDE 150 ML/HR: 600; 310; 30; 20 INJECTION, SOLUTION INTRAVENOUS at 14:16

## 2021-07-11 RX ADMIN — PENICILLIN G POTASSIUM 2.5 MILLION UNITS: 5000000 INJECTION, POWDER, FOR SOLUTION INTRAMUSCULAR; INTRAVENOUS at 14:15

## 2021-07-11 RX ADMIN — MINERAL OIL 30 ML: 1000 SOLUTION ORAL at 14:48

## 2021-07-11 RX ADMIN — LIDOCAINE HYDROCHLORIDE 30 ML: 10 INJECTION, SOLUTION EPIDURAL; INFILTRATION; INTRACAUDAL; PERINEURAL at 16:58

## 2021-07-11 RX ADMIN — SODIUM CHLORIDE, POTASSIUM CHLORIDE, SODIUM LACTATE AND CALCIUM CHLORIDE 150 ML/HR: 600; 310; 30; 20 INJECTION, SOLUTION INTRAVENOUS at 07:50

## 2021-07-11 RX ADMIN — FENTANYL CITRATE 25 MCG: 50 INJECTION, SOLUTION INTRAMUSCULAR; INTRAVENOUS at 14:02

## 2021-07-11 RX ADMIN — LIDOCAINE HYDROCHLORIDE 30 ML: 10 INJECTION, SOLUTION INFILTRATION; PERINEURAL at 16:58

## 2021-07-11 RX ADMIN — WITCH HAZEL 1 PAD: 500 SOLUTION RECTAL; TOPICAL at 18:13

## 2021-07-11 RX ADMIN — IBUPROFEN 800 MG: 800 TABLET ORAL at 22:07

## 2021-07-11 NOTE — L&D DELIVERY NOTE
VAGINAL DELIVERY NOTE          Date: 7/11/2021   Time:  2:49 PM   GA: 40w3d    Infant: female  infant   4500 g (9 lb 14.7 oz)     APGARS: 8  @ 1 minute / 9  @ 5 minutes    Delivery: The patient progressed to complete, complete and pushed for less than 5 minutes to deliver a viable infant in cephalic presentation weighing the above weight and above Apgars.  There was a loose nuchal cord.  It was easily reduced over the fetal vertex.  The mouth and nares were bulb suctioned on the perineum.  The left anterior and right posterior shoulders were easily delivered without traction.  The remainder of the body delivered.  The infant was vigorous.  Delayed cord clamping for 60 seconds.  The cord was then clamped and cut.  Infant was placed on the maternal chest for recovery.  The placenta delivered with the assistance of fundal massage and maternal pushing efforts.      On full evaluation of the perineum, vagina, cervix and rectum a repair was placed.  Second-degree, midline laceration.  It was repaired in the usual fashion.  Excellent approximation and hemostasis was assured.  External anal sphincter was intact.  Lidocaine was placed to assist with pain control, and IV fentanyl given once after delivery.    Counts were correct.      Perineum: 2nd degree laceration    EBL: 200 ml    Complications: None        Electronically signed by Ana Lennon DO, 07/11/21, 3:53 PM EDT.

## 2021-07-11 NOTE — PLAN OF CARE
Problem: Adult Inpatient Plan of Care  Goal: Plan of Care Review  7/11/2021 1842 by Annamarie Mcdonald RN  Outcome: Ongoing, Progressing  7/11/2021 0750 by Annamarie Mcdonald RN  Outcome: Ongoing, Progressing  Goal: Patient-Specific Goal (Individualized)  7/11/2021 1842 by Annamarie Mcdonald RN  Outcome: Ongoing, Progressing  7/11/2021 0750 by Annamarie Mcdonald RN  Outcome: Ongoing, Progressing  Goal: Absence of Hospital-Acquired Illness or Injury  7/11/2021 1842 by Annamarie Mcdonald RN  Outcome: Ongoing, Progressing  7/11/2021 0750 by Annamarie Mcdonald RN  Outcome: Ongoing, Progressing  Intervention: Prevent Skin Injury  Recent Flowsheet Documentation  Taken 7/11/2021 1715 by Annamarie Mcdonald RN  Skin Protection: transparent dressing maintained  Taken 7/11/2021 1515 by Annamarie Mcdonald RN  Body Position: position changed independently  Taken 7/11/2021 1013 by Annamarie Mcdonald RN  Body Position: position changed independently  Goal: Optimal Comfort and Wellbeing  7/11/2021 1842 by Annamarie Mcdonald RN  Outcome: Ongoing, Progressing  7/11/2021 0750 by Annamarie Mcdonald RN  Outcome: Ongoing, Progressing  Intervention: Provide Person-Centered Care  Recent Flowsheet Documentation  Taken 7/11/2021 1515 by Annamarie Mcdonald RN  Trust Relationship/Rapport:   care explained   choices provided   questions answered   emotional support provided   empathic listening provided   questions encouraged   reassurance provided   thoughts/feelings acknowledged  Taken 7/11/2021 0730 by Annamarie Mcdonald RN  Trust Relationship/Rapport:   care explained   emotional support provided   choices provided   empathic listening provided   questions answered   questions encouraged   reassurance provided   thoughts/feelings acknowledged  Goal: Readiness for Transition of Care  7/11/2021 1842 by Annamarie Mcdonald RN  Outcome: Ongoing, Progressing  7/11/2021 0750 by Annamarie Mcdonald RN  Outcome: Ongoing, Progressing  Intervention: Mutually Develop Transition Plan  Recent Flowsheet Documentation  Taken 7/11/2021 0804 by  Annamarie Mcdonald RN  Transportation Anticipated: car, drives self  Patient/Family Anticipated Services at Transition: none  Patient/Family Anticipates Transition to: home     Problem: Adjustment to Role Transition (Postpartum Vaginal Delivery)  Goal: Successful Maternal Role Transition  2021 1842 by Annamarie Mcdonald RN  Outcome: Ongoing, Progressing  2021 1737 by Annamarie Mcdonald RN  Outcome: Ongoing, Progressing  Intervention: Support Maternal Role Transition  Recent Flowsheet Documentation  Taken 2021 1715 by Annamarie Mcdonald RN  Supportive Measures: verbalization of feelings encouraged  Parent/Child Attachment Promotion:   strengths emphasized   positive reinforcement provided     Problem: Bleeding (Postpartum Vaginal Delivery)  Goal: Hemostasis  2021 1842 by Annamarie Mcdonald RN  Outcome: Ongoing, Progressing  2021 1737 by Annamarie Mcdonald RN  Outcome: Ongoing, Progressing  Intervention: Monitor and Manage Postpartum Bleeding  Recent Flowsheet Documentation  Taken 2021 1013 by Annamarie Mcdonald RN   Bleed Management: Rh status confirmed     Problem: Infection (Postpartum Vaginal Delivery)  Goal: Absence of Infection Signs and Symptoms  2021 1842 by Annamarie Mcdonald RN  Outcome: Ongoing, Progressing  2021 1737 by Annamarie Mcdonald RN  Outcome: Ongoing, Progressing  Intervention: Prevent or Manage Infection  Recent Flowsheet Documentation  Taken 2021 1715 by Annamarie Mcdonald RN  Perineal Care:   absorbent pad changed   perineum cleansed     Problem: Pain (Postpartum Vaginal Delivery)  Goal: Acceptable Pain Control  2021 1842 by Annamarie Mcdonald RN  Outcome: Ongoing, Progressing  2021 1737 by Annamarie Mcdonald RN  Outcome: Ongoing, Progressing     Problem: Urinary Retention (Postpartum Vaginal Delivery)  Goal: Effective Urinary Elimination  2021 1842 by Annamarie Mcdonald RN  Outcome: Ongoing, Progressing  2021 1737 by Annamraie Mcdonald RN  Outcome: Ongoing, Progressing   Goal Outcome Evaluation:

## 2021-07-11 NOTE — PROGRESS NOTES
OB Intrapartum Note    Subjective: Going natural, uncomfortable, but desires to continue wo epidural    Objective:  Baseline: Normal 110-160 bpm  Variability:   Moderate/Normal (amplitude 6-25 bpm)  Accelerations: Present (32 weeks+) 15 x 15 bpm  Decelerations: Absent   Contractions:  Regular, q3min and q4min    Cervical exam:    Dilation: 9cm, unable to push past    Effacement: 100%    Station: +1, OA position    Impression:    Category I  Reassuring fetus, Adequate progress    Plan:   Continue to monitor and Active labor positioning, recommend throne position        Electronically signed by Ana Lennon DO, 07/11/21, 1:07 PM EDT.

## 2021-07-11 NOTE — H&P
OB HISTORY AND PHYSICAL      SUBJECTIVE:    31 y.o. female  currently at 40w3d prenatal care is complicated by:  None    CC:  Presents with Labor    HPI:  Pt arrived in labor 5-6cm dilated, plans natural wo epidural.    ROS: No leaking fluid, No vaginal bleeding and Adequate FM    Past OB History:   7#3oz, uncomplicated    Prenatal Labs:  Reviewed, see PNR, labs of note: Apos, RI, 1hr 136, nl 3hr, GBS reported by pt wnl (no record)    PMHx:  None    Home Medications:  acetaminophen and prenatal vitamin    Allergies:  No Known Allergies    PSHx:  R breast I+D w mastitis    Social History: Tobacco- none, Drug use- none, ETOH- none    Family History: Non contributory    Immunizations: See prenatal record for Tdap, Flu, and/or Covid vaccinations    PHYSICAL EXAM:    Vitals: Reviewed in prenatal record and/or Epic/OBIX/AirStrip  General- NAD, alert and oriented, appropriate  Psych- normal mood, good memory  CV- Regular rhythm, no murnurs  Resp- CTA to bases, no wheezes  Abdomen- Gravid, non tender  Fundus-  Non tender. Size: consistent with dates, EFW 8 lbs, Pelvis is clinically adequate  Cvx- 7cm / 100% / -1, AROM clear  Presentation- VTX  Ext/DTR: Trace edema    Fetal HR: Category I  Contractions: Regular    Lab/Imaging/Other: see epic      ASSESSMENT:  40w3d  Labor  GBS: Negative, by report    PLAN:  Admit  Delivery: See labor orders, plan for   Plan of care NLT hospital course, R/B/A/potential SE, length have been reviewed with patient her  and mother, questions answered to her/his/their satisfaction.  Pt desires to proceed as above.  No specific labor or del desires except no epidural.  Reviewed IV narcotics and at delivery possible need narcan.  Will use fentanyl from now on, pt w hx and fhx rapid labor after ROM.      Electronically signed by Ana Lennon DO, 21, 8:36 AM EDT.    ADDENDUM: I was unable to find pt GBS result despite looking at path group, under different names- crystal,  hyphenated/not, etc.  Pt very uncomfortable in labor.  DW her  without result rec start PCN.  He agrees.

## 2021-07-11 NOTE — DISCHARGE INSTRUCTIONS
POSTPARTUM DISCHARGE PRECAUTIONS and Answers to FAQs     NO SEX for SIX weeks.     NO TUB BATH or POOL for TWO week(s), shower only.  Sitz baths are fine.     STITCHES (if present):  wash them daily in the shower with soap and water (any type of soap is fine, it does not need to be antibacterial soap).  It is ok to gently put your finger in and around the vaginal area.  Look at your stitches (the ones on the outside) when you get home.  You will then know what is normal and can have a point of reference to compare it to if you start to have concerns.  REDNESS, PUS, increase in PAIN, FEVER or CHILLS are all reasons to be seen our office immediately.  Go to the ER, if it is after hours or a weekend.       VAGINAL BLEEDING:  may continue on and off over the next several weeks after delivery and may increase slightly once you go home.  You should not be bleeding more than 1 large pad soaked every hour or two.  Clots (even the size of a lemon or larger) may be normal as long as the bleeding is not heavy and the clots do not continue.       FEVER or CHILLS or NOT FEELING WELL: call our office.  If the office is closed, you need to be seen in acute care or ER.       CHEST PAIN or SHORTNESS OF BREATH/AIR: you need to GO TO THE NEAREST ER or CALL 911.      SWELLING: can increase over the next 7-10 days and then should slowly improve.  Your legs/ankles should be fairly similar in size.  A red, painful, hot, swollen leg (usually just one side) can be a sign of a blood clot and should be evaluated immediately.  Call our office.  If it is after hours or a weekend, you must be seen IMMEDIATELY IN THE ER.      ELEVATED BLOOD PRESSURE:  you need to contact us if you are having  persistent elevated BP systolic (top number) more than 155 or diastolic (bottom number) more than 95, or a headache (not relieved with rest, hydration or over the counter pain reliever), an increase in your swelling (usually hands and face), changes in your  vision (typically flashing white or black spots) or severe persistent pain in the location of the upper right side of your belly (under your right breast).  Call our office or go to ER if after hours or a weekend.     LACTATION QUESTIONS or CONCERNS?  Call [facility] Lactation Support [phone].     WORK and SCHOOL TIME OFF: depends on your specific delivery type, surrounding circumstances, and your work insurance/school rules.  If you have questions, please call [facility] or [facility] at [phone] (ext. [phone]).  Or email [employer] at [email or web address].  They will assist in required paperwork for you and/or family members.      Any further QUESTIONS or CONCERNS, please call [facility] at [phone].

## 2021-07-11 NOTE — NURSING NOTE
Patient presents to L&D via ambulation with complaints of contractions off and on since 7/10/21 @ 1000. Two patient id verified, urine specimen collected. Efm and uc toco applied, abdomen palpates moderate uc with soft resting tone. Denies vaginal bleeding but reports some blood in the increased mucous discharge. Denies leaking of fluid and reports + fetal movement. Report to ARRON Mcdonald RN

## 2021-07-11 NOTE — DISCHARGE SUMMARY
OB Discharge Summary        Admit Date:  2021  Date of Delivery: 2021   Discharge Date: 21    Reason for Admission: Labor    Final Diagnosis:  40w3d Labor   2nd deg lac   Breast  To do at FU: NA    Antepartum:  Prenatal care is complicated by:  None    Intrapartum/Delivery:  OB Surgeon:  Ana Lennon DO  Anesthesia: Local and IV narcotics  Delivery Type:   Perineum: 2nd degree laceration  Feeding method: Breast    Infant: female  infant;     Weight: 4500 g (9 lb 14.7 oz)      APGARS: 8  @ 1 minute / 9  @ 5 minutes    Hospital Course/Significant Findings:  Patient arrived in spontaneous labor, had artificial rupture of membranes and had uncomplicated  and PP.    Discharge:         Discharge Medications        ASK your doctor about these medications        Instructions Start Date   prenatal (CLASSIC) vitamin  tablet  Generic drug: prenatal vitamin   1 tablet, Oral, Daily      TYLENOL 500 MG tablet  Generic drug: acetaminophen   500 mg, Oral, Every 6 Hours PRN                 Disposition: Home  Diet: Regular    Pelvic Rest: 6 weeks    Condition at discharge: Good    Follow up with: Ana Lennon DO or provider of her choice    Follow up in: 5 weeks    Complications: None

## 2021-07-12 VITALS
HEIGHT: 66 IN | RESPIRATION RATE: 18 BRPM | WEIGHT: 187 LBS | HEART RATE: 72 BPM | TEMPERATURE: 98 F | SYSTOLIC BLOOD PRESSURE: 108 MMHG | BODY MASS INDEX: 30.05 KG/M2 | OXYGEN SATURATION: 100 % | DIASTOLIC BLOOD PRESSURE: 72 MMHG

## 2021-07-12 RX ORDER — IBUPROFEN 800 MG/1
800 TABLET ORAL EVERY 8 HOURS PRN
Qty: 30 TABLET | Refills: 0 | Status: SHIPPED | OUTPATIENT
Start: 2021-07-12 | End: 2021-07-22

## 2021-07-12 RX ORDER — ACETAMINOPHEN 325 MG/1
650 TABLET ORAL EVERY 6 HOURS PRN
Qty: 30 TABLET | Refills: 0 | Status: SHIPPED | OUTPATIENT
Start: 2021-07-12 | End: 2021-07-22

## 2021-07-12 RX ADMIN — IBUPROFEN 800 MG: 800 TABLET ORAL at 05:55

## 2021-07-12 RX ADMIN — ACETAMINOPHEN 650 MG: 325 TABLET ORAL at 11:40

## 2021-07-12 RX ADMIN — WITCH HAZEL 1 PAD: 500 SOLUTION RECTAL; TOPICAL at 08:10

## 2021-07-12 RX ADMIN — DOCUSATE SODIUM 100 MG: 100 CAPSULE, LIQUID FILLED ORAL at 08:10

## 2021-07-12 RX ADMIN — ACETAMINOPHEN 650 MG: 325 TABLET ORAL at 05:55

## 2021-07-12 RX ADMIN — Medication: at 08:10

## 2021-07-12 NOTE — PROGRESS NOTES
"[facility]   Post-Partum Progress Note    Patient Name: Dominique Thapa  : 1990  MRN: [id number]  Primary Care Physician:  Herber Valero MD  Date of admission: 2021    [facility]  Post-Partum Progress Note    Subjective   Postpartum Day 1: Vaginal Delivery    The patient feels well.  Her pain is well controlled with nonsteroidal anti-inflammatory drugs and Tylenol.   She is ambulating well.  Patient describes her bleeding as moderate lochia.    Breastfeeding: declines.    Objective     Vital Signs Range for the last 24 hours  Temperature: Temp:  [97.6 °F (36.4 °C)-98 °F (36.7 °C)] 98 °F (36.7 °C)   Temp Source: Temp src: Oral   BP: BP: (105-140)/(53-72) 110/70   Pulse: Heart Rate:  [] 86   Respirations: Resp:  [16-18] 18   SPO2:     O2 Amount (l/min):     O2 Devices     Weight:       Admit Height:  Height: 167.6 cm (66\")      Physical Exam:  General:  no acute distress.  Abdomen: abdomen is soft without significant tenderness, masses, organomegaly or guarding. Fundus: appropriate, firm, non tender  Extremities: normal, atraumatic, no cyanosis, and trace edema.   Uterus Consistency: firm without massage  Uterus Position: midline  Lochia Amount: scant (less than 2.5 cm on pad/hr)  Lochia Color: rubra  Fundal Height: 1 cm below umbilicus  Perineum Appearance: swelling  Perineum Laceration/Abrasion Type: second degree laceration (fascia and perineal muscle)       Lab results reviewed:  Yes   Rubella:  No results found for: RUBELLAIGGIN Nurse Transcribed from prenatal record --    External Rubella Qual   Date Value Ref Range Status   2020 Immune  Final     Rh Status:    RH type   Date Value Ref Range Status   2021 Positive  Final     Immunizations: There is no immunization history for the selected administration types on file for this patient.    Assessment/Plan       Normal labor      Dominique Thapa is Day 1  post-partum  Vaginal, Spontaneous   .      Plan:  Desires " discharge home if infant able to go. 5 weeks f/u scheduled.      Rae Rebolledo CNM  7/12/2021  09:04 EDT

## 2021-07-12 NOTE — PLAN OF CARE
Problem: Adjustment to Role Transition (Postpartum Vaginal Delivery)  Goal: Successful Maternal Role Transition  Outcome: Ongoing, Progressing  Intervention: Support Maternal Role Transition  Recent Flowsheet Documentation  Taken 7/11/2021 2000 by Samanta Florez, RN  Supportive Measures:   active listening utilized   self-care encouraged     Problem: Bleeding (Postpartum Vaginal Delivery)  Goal: Hemostasis  Outcome: Ongoing, Progressing   Goal Outcome Evaluation:

## 2021-07-12 NOTE — PLAN OF CARE
Problem: Adult Inpatient Plan of Care  Goal: Plan of Care Review  Outcome: Met  Flowsheets (Taken 7/12/2021 1340)  Progress: improving  Plan of Care Reviewed With: patient  Goal: Patient-Specific Goal (Individualized)  Outcome: Met  Goal: Absence of Hospital-Acquired Illness or Injury  Outcome: Met  Intervention: Identify and Manage Fall Risk  Recent Flowsheet Documentation  Taken 7/12/2021 1300 by Kristy Rodríguez RN  Safety Promotion/Fall Prevention: safety round/check completed  Taken 7/12/2021 1200 by Kristy Rodríguez RN  Safety Promotion/Fall Prevention: safety round/check completed  Taken 7/12/2021 1100 by Kristy Rodríguez RN  Safety Promotion/Fall Prevention: safety round/check completed  Taken 7/12/2021 1000 by Kristy Rodríguez RN  Safety Promotion/Fall Prevention: safety round/check completed  Taken 7/12/2021 0900 by Kristy Rodríguez RN  Safety Promotion/Fall Prevention: safety round/check completed  Taken 7/12/2021 0800 by Kristy Rodríguez RN  Safety Promotion/Fall Prevention: safety round/check completed  Taken 7/12/2021 0700 by Kristy Rodríguez RN  Safety Promotion/Fall Prevention: safety round/check completed  Intervention: Prevent Skin Injury  Recent Flowsheet Documentation  Taken 7/12/2021 0800 by Kristy Rodríguez RN  Body Position: position changed independently  Goal: Optimal Comfort and Wellbeing  Outcome: Met  Goal: Readiness for Transition of Care  Outcome: Met     Problem: Adjustment to Role Transition (Postpartum Vaginal Delivery)  Goal: Successful Maternal Role Transition  Outcome: Met     Problem: Bleeding (Postpartum Vaginal Delivery)  Goal: Hemostasis  Outcome: Met     Problem: Infection (Postpartum Vaginal Delivery)  Goal: Absence of Infection Signs and Symptoms  Outcome: Met     Problem: Pain (Postpartum Vaginal Delivery)  Goal: Acceptable Pain Control  Outcome: Met     Problem: Urinary Retention (Postpartum Vaginal Delivery)  Goal: Effective Urinary Elimination  Outcome: Met   Goal Outcome  Evaluation:  Plan of Care Reviewed With: patient        Progress: improving

## 2021-07-30 ENCOUNTER — TELEPHONE (OUTPATIENT)
Dept: LACTATION | Facility: HOSPITAL | Age: 31
End: 2021-07-30

## 2021-07-30 NOTE — TELEPHONE ENCOUNTER
LC called to check on breastfeeding progress. Patient did not answer her phone so a message was left to call Lactation Dept at the [facility] at [phone] for issues,questions or concerns.

## 2021-11-11 ENCOUNTER — OFFICE VISIT (OUTPATIENT)
Dept: OBSTETRICS AND GYNECOLOGY | Facility: CLINIC | Age: 31
End: 2021-11-11

## 2021-11-11 VITALS
SYSTOLIC BLOOD PRESSURE: 101 MMHG | HEIGHT: 65 IN | DIASTOLIC BLOOD PRESSURE: 68 MMHG | BODY MASS INDEX: 26.49 KG/M2 | WEIGHT: 159 LBS | HEART RATE: 67 BPM

## 2021-11-11 DIAGNOSIS — Z01.419 WELL WOMAN EXAM WITH ROUTINE GYNECOLOGICAL EXAM: Primary | ICD-10-CM

## 2021-11-11 PROBLEM — Z37.9 NORMAL LABOR: Status: RESOLVED | Noted: 2021-07-11 | Resolved: 2021-11-11

## 2021-11-11 PROCEDURE — G0123 SCREEN CERV/VAG THIN LAYER: HCPCS | Performed by: OBSTETRICS & GYNECOLOGY

## 2021-11-11 PROCEDURE — 99395 PREV VISIT EST AGE 18-39: CPT | Performed by: OBSTETRICS & GYNECOLOGY

## 2021-11-11 PROCEDURE — 87624 HPV HI-RISK TYP POOLED RSLT: CPT | Performed by: OBSTETRICS & GYNECOLOGY

## 2021-11-11 NOTE — PROGRESS NOTES
"Well Woman Visit    CC: Scheduled annual well gyn visit  Chief Complaint   Patient presents with   • Gynecologic Exam     Yearly Pap       [facility] intake in the past?: Yes    Previously Qualified? YES     16.8% lifetime risk breast cancer  Tobacco/Nicotine use:  No    Contraception or HRT: None     HPI:   31 y.o. Contraception or HRT: Will accept pregnancy    History: PMHx, Meds, Allergies, PSHx, Social Hx, and POBHx all reviewed and updated.  PCP: does manage PMHx and preventative labs      /68   Pulse 67   Ht 165.1 cm (65\")   Wt 72.1 kg (159 lb)   LMP 10/21/2021   BMI 26.46 kg/m²     Physical Exam Physical Exam  Vitals and nursing note reviewed. Exam conducted with a chaperone present.   Constitutional:       Appearance: Normal appearance.   Neck:      Thyroid: No thyroid mass or thyromegaly.   Cardiovascular:      Rate and Rhythm: Regular rhythm.   Pulmonary:      Effort: Pulmonary effort is normal.      Unlabored  Chest:      Breasts: Soft        Right: No mass, nipple discharge or tenderness.  Small keloid from abscess drainage        Left: No mass, nipple discharge or tenderness.   Abdominal:      General: There is no distension.      Palpations: Abdomen is soft.      Tenderness: There is no guarding or rebound.   Genitourinary:     General: Normal vulva.      Labia:   No lesions     Urethra: No urethral pain or urethral lesion.      Vagina: Normal. No vaginal discharge, tenderness or prolapsed vaginal walls.      Cervix: Normal.      Uterus: Normal.       Adnexa: Right adnexa normal and left adnexa normal.   Musculoskeletal:      Cervical back: Neck supple. No tenderness.   Skin:     General: Skin is warm and dry.   Neurological:      Mental Status: She is alert and oriented to person, place, and time.   Psychiatric:         Mood and Affect: Mood normal.         Behavior: Behavior normal.         Thought Content: Thought content normal.       ASSESSMENT AND PLAN:  WWE    Diagnoses and all " orders for this visit:    1. Well woman exam with routine gynecological exam (Primary)        She understands the importance of having any ordered tests to be performed in a timely fashion.  The risks of not performing them include, but are not limited to, advanced cancer stages, bone loss from osteoporosis and/or subsequent increase in morbidity and/or mortality.  She is encouraged to review her results online and/or contact or office if she has questions.     Follow Up:  No follow-ups on file.        Doron Chávez Sr., MD  11/11/2021

## 2021-11-17 LAB
CYTOLOGIST CVX/VAG CYTO: NORMAL
CYTOLOGY CVX/VAG DOC CYTO: NORMAL
CYTOLOGY CVX/VAG DOC THIN PREP: NORMAL
DX ICD CODE: NORMAL
HIV 1 & 2 AB SER-IMP: NORMAL
HPV I/H RISK 4 DNA CVX QL PROBE+SIG AMP: NEGATIVE
OTHER STN SPEC: NORMAL
STAT OF ADQ CVX/VAG CYTO-IMP: NORMAL

## 2022-03-10 ENCOUNTER — OFFICE VISIT (OUTPATIENT)
Dept: FAMILY MEDICINE CLINIC | Facility: CLINIC | Age: 32
End: 2022-03-10

## 2022-03-10 VITALS
HEIGHT: 65 IN | BODY MASS INDEX: 27.29 KG/M2 | WEIGHT: 163.8 LBS | OXYGEN SATURATION: 99 % | HEART RATE: 80 BPM | DIASTOLIC BLOOD PRESSURE: 82 MMHG | TEMPERATURE: 97.1 F | SYSTOLIC BLOOD PRESSURE: 118 MMHG

## 2022-03-10 DIAGNOSIS — M25.571 ACUTE RIGHT ANKLE PAIN: ICD-10-CM

## 2022-03-10 DIAGNOSIS — Z00.00 ANNUAL PHYSICAL EXAM: Primary | ICD-10-CM

## 2022-03-10 LAB
ALBUMIN SERPL-MCNC: 4.6 G/DL (ref 3.5–5.2)
ALBUMIN/GLOB SERPL: 1.5 G/DL
ALP SERPL-CCNC: 126 U/L (ref 39–117)
ALT SERPL W P-5'-P-CCNC: 21 U/L (ref 1–33)
ANION GAP SERPL CALCULATED.3IONS-SCNC: 9.7 MMOL/L (ref 5–15)
AST SERPL-CCNC: 23 U/L (ref 1–32)
B-HCG UR QL: NEGATIVE
BASOPHILS # BLD AUTO: 0.05 10*3/MM3 (ref 0–0.2)
BASOPHILS NFR BLD AUTO: 0.7 % (ref 0–1.5)
BILIRUB SERPL-MCNC: 0.4 MG/DL (ref 0–1.2)
BUN SERPL-MCNC: 12 MG/DL (ref 6–20)
BUN/CREAT SERPL: 12.4 (ref 7–25)
CALCIUM SPEC-SCNC: 9.6 MG/DL (ref 8.6–10.5)
CHLORIDE SERPL-SCNC: 104 MMOL/L (ref 98–107)
CHOLEST SERPL-MCNC: 215 MG/DL (ref 0–200)
CO2 SERPL-SCNC: 25.3 MMOL/L (ref 22–29)
CREAT SERPL-MCNC: 0.97 MG/DL (ref 0.57–1)
DEPRECATED RDW RBC AUTO: 42.3 FL (ref 37–54)
EGFRCR SERPLBLD CKD-EPI 2021: 80.3 ML/MIN/1.73
EOSINOPHIL # BLD AUTO: 0.16 10*3/MM3 (ref 0–0.4)
EOSINOPHIL NFR BLD AUTO: 2.3 % (ref 0.3–6.2)
ERYTHROCYTE [DISTWIDTH] IN BLOOD BY AUTOMATED COUNT: 13.5 % (ref 12.3–15.4)
EXPIRATION DATE: NORMAL
GLOBULIN UR ELPH-MCNC: 3.1 GM/DL
GLUCOSE SERPL-MCNC: 90 MG/DL (ref 65–99)
HCT VFR BLD AUTO: 42.5 % (ref 34–46.6)
HDLC SERPL-MCNC: 49 MG/DL (ref 40–60)
HGB BLD-MCNC: 13.7 G/DL (ref 12–15.9)
IMM GRANULOCYTES # BLD AUTO: 0.01 10*3/MM3 (ref 0–0.05)
IMM GRANULOCYTES NFR BLD AUTO: 0.1 % (ref 0–0.5)
INTERNAL NEGATIVE CONTROL: NORMAL
INTERNAL POSITIVE CONTROL: NORMAL
LDLC SERPL CALC-MCNC: 150 MG/DL (ref 0–100)
LDLC/HDLC SERPL: 3.03 {RATIO}
LYMPHOCYTES # BLD AUTO: 2.21 10*3/MM3 (ref 0.7–3.1)
LYMPHOCYTES NFR BLD AUTO: 31.3 % (ref 19.6–45.3)
Lab: NORMAL
MCH RBC QN AUTO: 27.7 PG (ref 26.6–33)
MCHC RBC AUTO-ENTMCNC: 32.2 G/DL (ref 31.5–35.7)
MCV RBC AUTO: 86 FL (ref 79–97)
MONOCYTES # BLD AUTO: 0.7 10*3/MM3 (ref 0.1–0.9)
MONOCYTES NFR BLD AUTO: 9.9 % (ref 5–12)
NEUTROPHILS NFR BLD AUTO: 3.94 10*3/MM3 (ref 1.7–7)
NEUTROPHILS NFR BLD AUTO: 55.7 % (ref 42.7–76)
NRBC BLD AUTO-RTO: 0 /100 WBC (ref 0–0.2)
PLATELET # BLD AUTO: 301 10*3/MM3 (ref 140–450)
PMV BLD AUTO: 11.5 FL (ref 6–12)
POTASSIUM SERPL-SCNC: 4.1 MMOL/L (ref 3.5–5.2)
PROT SERPL-MCNC: 7.7 G/DL (ref 6–8.5)
RBC # BLD AUTO: 4.94 10*6/MM3 (ref 3.77–5.28)
SODIUM SERPL-SCNC: 139 MMOL/L (ref 136–145)
TRIGL SERPL-MCNC: 87 MG/DL (ref 0–150)
TSH SERPL DL<=0.05 MIU/L-ACNC: 1.86 UIU/ML (ref 0.27–4.2)
VLDLC SERPL-MCNC: 16 MG/DL (ref 5–40)
WBC NRBC COR # BLD: 7.07 10*3/MM3 (ref 3.4–10.8)

## 2022-03-10 PROCEDURE — 80050 GENERAL HEALTH PANEL: CPT | Performed by: NURSE PRACTITIONER

## 2022-03-10 PROCEDURE — 99385 PREV VISIT NEW AGE 18-39: CPT | Performed by: NURSE PRACTITIONER

## 2022-03-10 PROCEDURE — 80061 LIPID PANEL: CPT | Performed by: NURSE PRACTITIONER

## 2022-03-10 PROCEDURE — 81025 URINE PREGNANCY TEST: CPT | Performed by: NURSE PRACTITIONER

## 2022-03-10 PROCEDURE — 36415 COLL VENOUS BLD VENIPUNCTURE: CPT | Performed by: NURSE PRACTITIONER

## 2022-03-10 RX ORDER — DIPHENOXYLATE HYDROCHLORIDE AND ATROPINE SULFATE 2.5; .025 MG/1; MG/1
TABLET ORAL DAILY
COMMUNITY
End: 2023-03-21

## 2022-03-10 RX ORDER — IBUPROFEN 600 MG/1
600 TABLET ORAL EVERY 6 HOURS PRN
Qty: 60 TABLET | Refills: 1 | Status: SHIPPED | OUTPATIENT
Start: 2022-03-10
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
## 2022-03-21 ENCOUNTER — OFFICE VISIT (OUTPATIENT)
Dept: OBSTETRICS AND GYNECOLOGY | Facility: CLINIC | Age: 32
End: 2022-03-21

## 2022-03-21 VITALS
DIASTOLIC BLOOD PRESSURE: 72 MMHG | HEIGHT: 65 IN | SYSTOLIC BLOOD PRESSURE: 102 MMHG | BODY MASS INDEX: 26.66 KG/M2 | WEIGHT: 160 LBS | HEART RATE: 77 BPM

## 2022-03-21 DIAGNOSIS — N93.9 ABNORMAL UTERINE BLEEDING (AUB): Primary | ICD-10-CM

## 2022-03-21 LAB
B-HCG UR QL: NEGATIVE
EXPIRATION DATE: NORMAL
INTERNAL NEGATIVE CONTROL: NORMAL
INTERNAL POSITIVE CONTROL: NORMAL
Lab: NORMAL

## 2022-03-21 PROCEDURE — 99213 OFFICE O/P EST LOW 20 MIN: CPT | Performed by: NURSE PRACTITIONER

## 2022-03-21 PROCEDURE — 81025 URINE PREGNANCY TEST: CPT | Performed by: NURSE PRACTITIONER

## 2022-03-21 NOTE — PROGRESS NOTES
"GYN Problem/Follow Up Visit    Chief Complaint   Patient presents with   • Follow-up     Longer periods           HPI  Dominiqueromeo Thapa is a 32 y.o. female, , who presents for 5 week menstrual bleed, fluctuates from heavy to light flow, change products q 3 hours heavy days. Mild menstrual cramping. Menses irregular since last childbirth . No contraceptive use, considering conception in the next few years.  Extreme fatigue for past month. Weight gain 10 pounds over past month. Hx Iron deficiency Anemia. Recent lab per PCP, cbc and tsh wnl.    Additional OB/GYN History   Patient's last menstrual period was 2022 (exact date).  Current contraception: contraceptive methods: None  Desires to: do not start contraception  Allergies : Patient has no known allergies.     The additional following portions of the patient's history were reviewed and updated as appropriate: allergies, current medications, past family history, past medical history, past social history, past surgical history and problem list.    Review of Systems    I have reviewed and agree with the HPI, ROS, and historical information as entered above. Bren Russell, APRN    Objective   /72   Pulse 77   Ht 165.1 cm (65\")   Wt 72.6 kg (160 lb)   LMP 2022 (Exact Date)   Breastfeeding No   BMI 26.63 kg/m²     Physical Exam  Vitals and nursing note reviewed. Exam conducted with a chaperone present.   Constitutional:       Appearance: Normal appearance.   Neck:      Thyroid: No thyromegaly.   Cardiovascular:      Rate and Rhythm: Normal rate and regular rhythm.      Heart sounds: Normal heart sounds.   Pulmonary:      Effort: Pulmonary effort is normal.      Breath sounds: Normal breath sounds.   Abdominal:      General: There is no distension.      Palpations: Abdomen is soft.      Tenderness: There is no right CVA tenderness or left CVA tenderness.   Genitourinary:     General: Normal vulva.      Vagina: Bleeding (menstrual) " present.      Cervix: Normal.      Uterus: Normal.       Adnexa:         Right: Tenderness present.         Left: Tenderness present.    Lymphadenopathy:      Lower Body: No right inguinal adenopathy. No left inguinal adenopathy.   Skin:     General: Skin is warm and dry.   Neurological:      Mental Status: She is alert and oriented to person, place, and time.          Assessment and Plan    Diagnoses and all orders for this visit:    1. Abnormal uterine bleeding (AUB) (Primary)  -     US Non-ob Transvaginal; Future  -     POC Pregnancy, Urine      HCG, Urine, QL   Date Value Ref Range Status   03/21/2022 Negative Negative Final      Counseling:  • All BC Options R/B/A/SE/E of each  • Safe Sex/Condoms  • PNV        She understands the importance of having the above orders performed in a timely fashion.  The risks of not performing them include, but are not limited to, cancer and/or subsequent increase in morbidity and/or mortality.  She is encouraged to review her results online and/or contact or office if she has questions.     Follow Up:  Return for after pelvic ultrasound.        CLARIBEL Handley  03/21/2022

## 2022-04-04 ENCOUNTER — HOSPITAL ENCOUNTER (OUTPATIENT)
Dept: GENERAL RADIOLOGY | Facility: HOSPITAL | Age: 32
Discharge: HOME | End: 2022-04-04
Admitting: NURSE PRACTITIONER

## 2022-04-04 DIAGNOSIS — M25.571 ACUTE RIGHT ANKLE PAIN: ICD-10-CM

## 2022-04-04 PROCEDURE — 73610 X-RAY EXAM OF ANKLE: CPT

## 2022-04-19 ENCOUNTER — HOSPITAL ENCOUNTER (OUTPATIENT)
Dept: ULTRASOUND IMAGING | Facility: HOSPITAL | Age: 32
Discharge: HOME | End: 2022-04-19
Admitting: NURSE PRACTITIONER

## 2022-04-19 DIAGNOSIS — N93.9 ABNORMAL UTERINE BLEEDING (AUB): ICD-10-CM

## 2022-04-19 PROCEDURE — 76830 TRANSVAGINAL US NON-OB: CPT

## 2022-04-20 ENCOUNTER — TELEPHONE (OUTPATIENT)
Dept: OBSTETRICS AND GYNECOLOGY | Facility: CLINIC | Age: 32
End: 2022-04-20

## 2022-04-20 NOTE — TELEPHONE ENCOUNTER
Patient given results of pelvic ultrasound per Bren's note. Patient still declines OCP's at this time as she and her spouse are trying to conceive.  She will call back if she desires a prescription.

## 2022-04-20 NOTE — TELEPHONE ENCOUNTER
----- Message from CLARIBEL Handley sent at 4/20/2022 11:23 AM EDT -----  Small ovarian cysts, small fibroids, these are benign conditions, as discussed combined oral contraceptives are recommended to regulate menstrual bleeding, suppress ovarian cysts from forming as well as manage fibroids. She had declined hormone management at time of visit. If she navarro like a trial of ocp I would be glad to send in treatment.

## 2022-04-21 ENCOUNTER — OFFICE VISIT (OUTPATIENT)
Dept: FAMILY MEDICINE CLINIC | Facility: CLINIC | Age: 32
End: 2022-04-21

## 2022-04-21 VITALS
TEMPERATURE: 96.9 F | WEIGHT: 159.2 LBS | SYSTOLIC BLOOD PRESSURE: 100 MMHG | DIASTOLIC BLOOD PRESSURE: 64 MMHG | OXYGEN SATURATION: 100 % | HEIGHT: 65 IN | HEART RATE: 82 BPM | BODY MASS INDEX: 26.52 KG/M2

## 2022-04-21 DIAGNOSIS — Z09 FOLLOW-UP EXAM: Primary | ICD-10-CM

## 2022-04-21 DIAGNOSIS — M25.571 ACUTE RIGHT ANKLE PAIN: ICD-10-CM

## 2022-04-21 PROCEDURE — 99213 OFFICE O/P EST LOW 20 MIN: CPT | Performed by: NURSE PRACTITIONER

## 2022-04-21 NOTE — PROGRESS NOTES
"Chief Complaint  Follow-up and Ankle Pain (Better)    Subjective          Medical History: has a past medical history of Mastitis (2020) and Ovarian cyst.     Surgical History: has a past surgical history that includes Breast Abscess Incision and Drainage (2020).     Family History: family history includes Breast cancer (age of onset: 30) in her paternal aunt; Heart disease in her mother; Thyroid disease in her maternal grandmother.     Social History: reports that she has never smoked. She has never used smokeless tobacco. She reports that she does not drink alcohol and does not use drugs.    Dominique Thapa presents to [facility]  The incident occurred more than 8 week ago. There was no injury mechanism. The pain is present in the right ankle. The pain is at a severity of 9/10 (at its worse). The pain has resolved, The pain has been improving since onset. Associated symptoms include improvement with inability to bear weight. She reports no foreign bodies present. The symptoms were aggravated by movement. She has tried stretching, ice, wearing brace (pain went down on its own) symptoms have improved.       Objective   Vital Signs:   /64 (BP Location: Left arm, Patient Position: Sitting, Cuff Size: Adult)   Pulse 82   Temp 96.9 °F (36.1 °C) (Temporal)   Ht 165.1 cm (65\")   Wt 72.2 kg (159 lb 3.2 oz)   SpO2 100%   BMI 26.49 kg/m²     Physical Exam  Vitals reviewed.   Constitutional:       Appearance: Normal appearance. She is well-developed.   HENT:      Head: Normocephalic and atraumatic.   Eyes:      Conjunctiva/sclera: Conjunctivae normal.      Pupils: Pupils are equal, round, and reactive to light.   Cardiovascular:      Rate and Rhythm: Normal rate and regular rhythm.      Heart sounds: No murmur heard.  Pulmonary:      Effort: Pulmonary effort is normal.      Breath sounds: Normal breath sounds. No wheezing or rhonchi.   Musculoskeletal:      Right ankle: No " swelling or deformity. Normal range of motion.      Right Achilles Tendon: Normal.   Skin:     General: Skin is warm and dry.   Neurological:      Mental Status: She is alert and oriented to person, place, and time.   Psychiatric:         Mood and Affect: Mood and affect normal.         Behavior: Behavior normal.         Thought Content: Thought content normal.         Judgment: Judgment normal.        Result Review :   The following data was reviewed by: CLARIBEL Guillen on 04/21/2022:  Common labs    Common Labsle 7/11/21 3/10/22 3/10/22 3/10/22     0943 0943 0943   Glucose   90    BUN   12    Creatinine   0.97    Sodium   139    Potassium   4.1    Chloride   104    Calcium   9.6    Albumin   4.60    Total Bilirubin   0.4    Alkaline Phosphatase   126 (A)    AST (SGOT)   23    ALT (SGPT)   21    WBC 12.06 (A) 7.07     Hemoglobin 10.8 (A) 13.7     Hematocrit 34.0 42.5     Platelets 186 301     Total Cholesterol    215 (A)   Triglycerides    87   HDL Cholesterol    49   LDL Cholesterol     150 (A)   (A) Abnormal value            Data reviewed: Previous ankle x-ray and office note            Current Outpatient Medications on File Prior to Visit   Medication Sig Dispense Refill   • acetaminophen (TYLENOL) 500 MG tablet Take 500 mg by mouth Every 6 (Six) Hours As Needed for mild pain (1-3).     • ibuprofen (ADVIL,MOTRIN) 600 MG tablet Take 1 tablet by mouth Every 6 (Six) Hours As Needed for Mild Pain  or Moderate Pain . 60 tablet 1   • multivitamin (THERAGRAN) tablet tablet Take  by mouth Daily.       No current facility-administered medications on file prior to visit.        Assessment and Plan    Diagnoses and all orders for this visit:    1. Follow-up exam (Primary)    2. Acute right ankle pain  Comments:  improved greatly, no issues at this time. Continue to stretch, follow up if symptoms worsening         Follow Up   Return for If symptoms do not improve new concerning symptoms.  Patient was given  instructions and counseling regarding her condition or for health maintenance advice. Please see specific information pulled into the AVS if appropriate.

## 2023-03-21 ENCOUNTER — OFFICE VISIT (OUTPATIENT)
Dept: OBSTETRICS AND GYNECOLOGY | Facility: CLINIC | Age: 33
End: 2023-03-21
Payer: COMMERCIAL

## 2023-03-21 VITALS
BODY MASS INDEX: 23.82 KG/M2 | WEIGHT: 143 LBS | HEIGHT: 65 IN | DIASTOLIC BLOOD PRESSURE: 66 MMHG | SYSTOLIC BLOOD PRESSURE: 100 MMHG | HEART RATE: 56 BPM

## 2023-03-21 DIAGNOSIS — Z01.419 WELL WOMAN EXAM: Primary | ICD-10-CM

## 2023-03-21 DIAGNOSIS — Z80.3 FAMILY HISTORY OF BREAST CANCER: ICD-10-CM

## 2023-03-21 DIAGNOSIS — E28.2 PCOS (POLYCYSTIC OVARIAN SYNDROME): ICD-10-CM

## 2023-03-21 PROCEDURE — 99395 PREV VISIT EST AGE 18-39: CPT | Performed by: NURSE PRACTITIONER

## 2023-03-21 PROCEDURE — 81025 URINE PREGNANCY TEST: CPT | Performed by: NURSE PRACTITIONER

## 2023-03-21 NOTE — PROGRESS NOTES
"  HPI:   33 y.o. . Presents for well woman exam. Contraception:  Natural family planning  Menses:   Irregular, every 1-2 months, lasts 3-14 days, changes ultra pad and super tampon q 1 hrs on heaviest days.   Pain:  mild to severe, intermittent  Last pap: normal   Complaints: Desires pregnancy, irregular menses. Unable to conceive off contraceptives for over 1 year. Hx of PCOS   Has tried timing intercourse, has not tried ovulation kits    Past Medical History:   Diagnosis Date   • Mastitis    • Ovarian cyst       Past Surgical History:   Procedure Laterality Date   • BREAST ABSCESS INCISION AND DRAINAGE        Family History   Problem Relation Age of Onset   • Heart disease Mother    • Thyroid disease Maternal Grandmother    • Breast cancer Paternal Aunt 30   • Ovarian cancer Neg Hx    • Uterine cancer Neg Hx    • Colon cancer Neg Hx      Allergies as of 2023   • (No Known Allergies)        PCP: does manage PMHx and preventative labs    /66   Pulse 56   Ht 165.1 cm (65\")   Wt 64.9 kg (143 lb)   LMP  (LMP Unknown) Comment: 2 MONTHS AGO  BMI 23.80 kg/m²     PHYSICAL EXAM: Chaperone present   General- NAD, alert and oriented, appropriate  Psych- Normal mood, good memory  Neck- No masses, no thyroid enlargement  Lymphatic- No palpable neck, axillary, or groin nodes  CV- Regular rhythm, no murmurs  Resp- CTA to bases, no wheezes  Abdomen- Soft, non distended, non tender, no masses  Breast left-  Bilaterally symmetrical, no masses, non tender, no nipple discharge  Breast right- Bilaterally symmetrical, no masses, non tender, no nipple discharge  External genitalia- Normal female, no lesions  Urethra/meatus- Normal, no masses, non tender, no prolapse  Bladder- Normal, no masses, non tender, no prolapse  Vagina- Normal, no atrophy, no lesions, no discharge, no prolapse  Cvx- Normal, no lesions, no discharge, No cervical motion tenderness  Uterus- Normal size, shape & consistency.  Non " tender, mobile, & no prolapse  Adnexa- No mass, non tender  Anus/Rectum/Perineum- Not performed  Ext- No edema, no cyanosis    Skin- No lesions, no rashes, no acanthosis nigricans       ASSESSMENT and PLAN:    Diagnoses and all orders for this visit:    1. Well woman exam (Primary)    2. Family history of breast cancer  -     Mammo Screening Digital Tomosynthesis Bilateral With CAD; Future  -     MRI Breast Bilateral With & Without Contrast; Future    3. PCOS (polycystic ovarian syndrome)  -     POC Pregnancy, Urine      HCG, Urine, QL   Date Value Ref Range Status   03/21/2023 Negative Negative Final      Preventative:   BREAST HEALTH- Breast awareness, self breast exam, MMG and/or MRI reviewed per society guidelines and her individual history. Screen: Updated today  CERVICAL CANCER Screening- Reviewed current ASCCP guidelines for screening w and wo cotest HPV, age specific.  Screen: Already up to date  COLON CANCER Screening- Reviewed current medical society guidelines and options.  Screen:  Not medically needed  SEXUAL HEALTH: Declines STD screening,  Safe sex and condoms  Importance of WEIGHT MANAGEMENT, nutrition, and exercise reviewed  BONE HEALTH- Reviewed current medical society guidelines and options for testing, calcium and vit D intake.  Weight bearing exercise.  DEXA: Not medically needed  VACCINATIONS Recommended: COVID and booster PRN, Flu annually.  Importance discussed, risk being unvaccinated reviewed.  Questions answered  Smoking status- NON SMOKER  Myriad: Previously screened increased risk  TRACK MENSES, RTO if <q21d, >7d long, heavy or painful.    All BIRTH CONTROL options R/B/A/SE/E of each reviewed in detail.  SAFE SEX/condoms importance reviewed.    PNV daily, healthy lifestyle  FU with MD to discuss fertility assistance, PCOS    She understands the importance of having any ordered tests to be performed in a timely fashion.  The risks of not performing them include, but are not limited to,  advanced cancer stages, bone loss from osteoporosis and/or subsequent increase in morbidity and/or mortality.  She is encouraged to review her results online and/or contact or office if she has questions.     Follow Up:  Return for with md discuss fertility assistance/PCOS.        Bren Russell, APRN  03/21/2023

## 2023-03-23 ENCOUNTER — OFFICE VISIT (OUTPATIENT)
Dept: OBSTETRICS AND GYNECOLOGY | Facility: CLINIC | Age: 33
End: 2023-03-23
Payer: COMMERCIAL

## 2023-03-23 VITALS
HEART RATE: 80 BPM | HEIGHT: 65 IN | WEIGHT: 147.6 LBS | BODY MASS INDEX: 24.59 KG/M2 | DIASTOLIC BLOOD PRESSURE: 76 MMHG | SYSTOLIC BLOOD PRESSURE: 114 MMHG

## 2023-03-23 DIAGNOSIS — R10.2 CHRONIC PELVIC PAIN IN FEMALE: ICD-10-CM

## 2023-03-23 DIAGNOSIS — G89.29 CHRONIC PELVIC PAIN IN FEMALE: ICD-10-CM

## 2023-03-23 DIAGNOSIS — N97.0 INFERTILITY, ANOVULATION: Primary | ICD-10-CM

## 2023-03-23 LAB
DEPRECATED RDW RBC AUTO: 39.5 FL (ref 37–54)
ERYTHROCYTE [DISTWIDTH] IN BLOOD BY AUTOMATED COUNT: 13.1 % (ref 12.3–15.4)
HCT VFR BLD AUTO: 39.4 % (ref 34–46.6)
HGB BLD-MCNC: 13.2 G/DL (ref 12–15.9)
MCH RBC QN AUTO: 27.7 PG (ref 26.6–33)
MCHC RBC AUTO-ENTMCNC: 33.5 G/DL (ref 31.5–35.7)
MCV RBC AUTO: 82.8 FL (ref 79–97)
PLATELET # BLD AUTO: 297 10*3/MM3 (ref 140–450)
PMV BLD AUTO: 12 FL (ref 6–12)
RBC # BLD AUTO: 4.76 10*6/MM3 (ref 3.77–5.28)
WBC NRBC COR # BLD: 8.23 10*3/MM3 (ref 3.4–10.8)

## 2023-03-23 PROCEDURE — 83498 ASY HYDROXYPROGESTERONE 17-D: CPT | Performed by: OBSTETRICS & GYNECOLOGY

## 2023-03-23 PROCEDURE — 82627 DEHYDROEPIANDROSTERONE: CPT | Performed by: OBSTETRICS & GYNECOLOGY

## 2023-03-23 PROCEDURE — 83002 ASSAY OF GONADOTROPIN (LH): CPT | Performed by: OBSTETRICS & GYNECOLOGY

## 2023-03-23 PROCEDURE — 84403 ASSAY OF TOTAL TESTOSTERONE: CPT | Performed by: OBSTETRICS & GYNECOLOGY

## 2023-03-23 PROCEDURE — 82672 ASSAY OF ESTROGEN: CPT | Performed by: OBSTETRICS & GYNECOLOGY

## 2023-03-23 PROCEDURE — 84146 ASSAY OF PROLACTIN: CPT | Performed by: OBSTETRICS & GYNECOLOGY

## 2023-03-23 PROCEDURE — 85027 COMPLETE CBC AUTOMATED: CPT | Performed by: OBSTETRICS & GYNECOLOGY

## 2023-03-23 PROCEDURE — 84443 ASSAY THYROID STIM HORMONE: CPT | Performed by: OBSTETRICS & GYNECOLOGY

## 2023-03-23 PROCEDURE — 84144 ASSAY OF PROGESTERONE: CPT | Performed by: OBSTETRICS & GYNECOLOGY

## 2023-03-23 PROCEDURE — 83001 ASSAY OF GONADOTROPIN (FSH): CPT | Performed by: OBSTETRICS & GYNECOLOGY

## 2023-03-23 PROCEDURE — 83036 HEMOGLOBIN GLYCOSYLATED A1C: CPT | Performed by: OBSTETRICS & GYNECOLOGY

## 2023-03-23 NOTE — PROGRESS NOTES
"GYN Visit    Chief Complaint   Patient presents with   • Follow-up     Discuss hormones and fertility       HPI:   33 y.o. LMP: Patient's last menstrual period was 2023. Here today to discuss chronic pelvic pain and infertility. She states she will go 1-2 months without a period and then have a heavy period that will last 1-2 weeks with passage of large clots. She also has painful periods and states that she has been told in the past that she might have endometriosis but she has never had a surgery to confirm this. She has had two successful pregnancies without any need for ovulation induction. She was counseled that we can obtain PCOS labs and an US to rule out any anatomical or metabolic reason for irregular and painful menses. She states her and her partner have been trying for a year now, she states that this is the same partner from her other two pregnancies. She denies any complaints today, she denies any vaginal odor or discharge, dysuria or hematuria, F/C, D/C, N/V, Cp or SOB.     Social History     Substance and Sexual Activity   Sexual Activity Yes   • Partners: Male   • Birth control/protection: None    Comment:        History: PMHx, Meds, Allergies, PSHx, Social Hx, and POBHx all reviewed and updated.    PHYSICAL EXAM:  /76   Pulse 80   Ht 165.1 cm (65\")   Wt 67 kg (147 lb 9.6 oz)   LMP 2023 Comment: 2 MONTHS AGO  BMI 24.56 kg/m²   General- NAD, alert and oriented, appropriate  Psych- Normal mood, good memory    Neck- No masses, no thyroid enlargement  Cardiovascular- Regular rhythm, no murnurs  Respiratory- CTA to bases, no wheezes  Abdomen- Soft, non distended, non tender, no masses    ASSESSMENT AND PLAN:  Diagnoses and all orders for this visit:    1. Infertility, anovulation (Primary)  -     DHEA-Sulfate  -     17-Hydroxyprogesterone  -     TSH; Future  -     Prolactin; Future  -     CBC (No Diff); Future  -     Follicle Stimulating Hormone; Future  -     " Luteinizing Hormone; Future  -     Hemoglobin A1c; Future  -     Estrogens, Total; Future  -     Progesterone; Future  -     Testosterone - total; Future  -     TSH  -     Prolactin  -     CBC (No Diff)  -     Follicle Stimulating Hormone  -     Luteinizing Hormone  -     Hemoglobin A1c  -     Estrogens, Total  -     Progesterone  -     Testosterone - total    2. Chronic pelvic pain in female  -     US Non-ob Transvaginal; Future      Follow Up:  Return in about 4 weeks (around 4/20/2023) for Follow up for results and US.    I spent 20 minutes caring for Dominique on this date of service. This time includes time spent by me in the following activities:preparing for the visit, reviewing tests, obtaining and/or reviewing a separately obtained history and counseling and educating the patient/family/caregiver    Amparo Hogan DO  03/23/2023    [facility] [place] [facility]  [address] [place] [address]  Dept: [phone]  Dept Fax: [phone]  Loc: [phone]  Loc Fax: [phone]

## 2023-03-24 LAB
FSH SERPL-ACNC: 5.64 MIU/ML
HBA1C MFR BLD: 5.3 % (ref 4.8–5.6)
LH SERPL-ACNC: 6.13 MIU/ML
PROGEST SERPL-MCNC: 0.24 NG/ML
PROLACTIN SERPL-MCNC: 10.6 NG/ML (ref 4.79–23.3)
TESTOST SERPL-MCNC: 17.5 NG/DL (ref 8.4–48.1)
TSH SERPL DL<=0.05 MIU/L-ACNC: 1.2 UIU/ML (ref 0.27–4.2)

## 2023-03-25 LAB — DHEA-S SERPL-MCNC: 121 UG/DL (ref 84.8–378)

## 2023-03-27 LAB — ESTROGEN SERPL-MCNC: 566 PG/ML

## 2023-04-03 LAB — 17OHP SERPL-MCNC: 30 NG/DL

## 2023-04-05 ENCOUNTER — HOSPITAL ENCOUNTER (OUTPATIENT)
Dept: MRI IMAGING | Facility: HOSPITAL | Age: 33
Discharge: HOME | End: 2023-04-05
Payer: COMMERCIAL

## 2023-04-05 ENCOUNTER — HOSPITAL ENCOUNTER (OUTPATIENT)
Dept: MAMMOGRAPHY | Facility: HOSPITAL | Age: 33
Discharge: HOME | End: 2023-04-05
Payer: COMMERCIAL

## 2023-04-05 DIAGNOSIS — Z80.3 FAMILY HISTORY OF BREAST CANCER: ICD-10-CM

## 2023-04-05 PROCEDURE — 0 GADOBENATE DIMEGLUMINE 529 MG/ML SOLUTION: Performed by: NURSE PRACTITIONER

## 2023-04-05 PROCEDURE — 77067 SCR MAMMO BI INCL CAD: CPT

## 2023-04-05 PROCEDURE — A9577 INJ MULTIHANCE: HCPCS | Performed by: NURSE PRACTITIONER

## 2023-04-05 PROCEDURE — 77063 BREAST TOMOSYNTHESIS BI: CPT

## 2023-04-05 PROCEDURE — 77049 MRI BREAST C-+ W/CAD BI: CPT

## 2023-04-05 RX ADMIN — GADOBENATE DIMEGLUMINE 12 ML: 529 INJECTION, SOLUTION INTRAVENOUS at 10:20

## 2023-04-07 ENCOUNTER — TELEPHONE (OUTPATIENT)
Dept: OBSTETRICS AND GYNECOLOGY | Facility: CLINIC | Age: 33
End: 2023-04-07
Payer: COMMERCIAL

## 2023-04-07 NOTE — TELEPHONE ENCOUNTER
----- Message from CLARIBEL Roldan sent at 4/6/2023 11:51 AM EDT -----  No MRI evidence of malignancy, did detect a 3mm skin lesion in right superior central breast  and a 2 mm skin lesion in the upper inner left breast which could be picking up moles etc, would like her to have Dr. Hogan evaluate for skin lesions to account for MRI findings when seen as already scheduled for follow up with her in 2 weeks.

## 2023-04-07 NOTE — TELEPHONE ENCOUNTER
Patient called back and advised of her results and the recommendation to follow up regarding this at her scheduled appointment. Appointment notes updated with that additional information.

## 2023-04-20 ENCOUNTER — OFFICE VISIT (OUTPATIENT)
Dept: OBSTETRICS AND GYNECOLOGY | Facility: CLINIC | Age: 33
End: 2023-04-20
Payer: COMMERCIAL

## 2023-04-20 VITALS
DIASTOLIC BLOOD PRESSURE: 80 MMHG | WEIGHT: 139.2 LBS | SYSTOLIC BLOOD PRESSURE: 116 MMHG | HEIGHT: 66 IN | HEART RATE: 75 BPM | BODY MASS INDEX: 22.37 KG/M2

## 2023-04-20 DIAGNOSIS — N97.0 INFERTILITY, ANOVULATION: Primary | ICD-10-CM

## 2023-04-20 RX ORDER — MEDROXYPROGESTERONE ACETATE 10 MG/1
10 TABLET ORAL DAILY
Qty: 5 TABLET | Refills: 2 | Status: SHIPPED | OUTPATIENT
Start: 2023-04-20

## 2023-04-20 RX ORDER — LETROZOLE 2.5 MG/1
2.5 TABLET, FILM COATED ORAL DAILY
Qty: 5 TABLET | Refills: 2 | Status: SHIPPED | OUTPATIENT
Start: 2023-04-20

## 2023-04-20 RX ORDER — MULTIPLE VITAMINS W/ MINERALS TAB 9MG-400MCG
1 TAB ORAL DAILY
COMMUNITY

## 2023-04-20 NOTE — PROGRESS NOTES
"GYN Visit    Chief Complaint   Patient presents with   • Follow-up       HPI:   33 y.o. LMP: Patient's last menstrual period was 2023. Here today to discuss PCOS labs and US. PCOS labs overall WNL but US does show multiple follicles consistent with PCOS. She is also anovulatory and going several months at a time without menses. She was given options of continue to try on own for at least 1 year or to start ovulation induction medications. She is wishing to start Femara to help with ovulation. She was counseled on risk of twin gestation and side effects. She wishes to proceed with medication. She was counseled on how to take medication.     Social History     Substance and Sexual Activity   Sexual Activity Yes   • Partners: Male   • Birth control/protection: None    Comment:        History: PMHx, Meds, Allergies, PSHx, Social Hx, and POBHx all reviewed and updated.    PHYSICAL EXAM:  /80   Pulse 75   Ht 167.6 cm (66\")   Wt 63.1 kg (139 lb 3.2 oz)   LMP 2023 Comment: 2 MONTHS AGO  BMI 22.47 kg/m²   General- NAD, alert and oriented, appropriate  Psych- Normal mood, good memory    Neck- No masses, no thyroid enlargement  Cardiovascular- Regular rhythm, no murnurs  Respiratory- CTA to bases, no wheezes  Abdomen- Soft, non distended, non tender, no masses    ASSESSMENT AND PLAN:  Diagnoses and all orders for this visit:    1. Infertility, anovulation (Primary)  -     letrozole (Femara) 2.5 MG tablet; Take 1 tablet by mouth Daily. On days 3-7 of menses  Dispense: 5 tablet; Refill: 2  -     medroxyPROGESTERone (Provera) 10 MG tablet; Take 1 tablet by mouth Daily.  Dispense: 5 tablet; Refill: 2      TRACK MENSES, RTO if <q21d, >7d long, heavy or painful.      Return in about 2 months (around 2023) for Follow up fertilty .    I spent 20 minutes caring for Dominique on this date of service. This time includes time spent by me in the following activities:preparing for the visit, reviewing " tests, obtaining and/or reviewing a separately obtained history, performing a medically appropriate examination and/or evaluation , counseling and educating the patient/family/caregiver and ordering medications, tests, or procedures    Amparo Hogan DO  04/20/2023    [facility] [place] [facility]  [facility] [place] [address]  Dept: [phone]  Dept Fax: [phone]  Loc: [phone]  Loc Fax: [phone]

## 2023-07-26 ENCOUNTER — TELEPHONE (OUTPATIENT)
Dept: OBSTETRICS AND GYNECOLOGY | Facility: CLINIC | Age: 33
End: 2023-07-26
Payer: COMMERCIAL

## 2023-07-26 DIAGNOSIS — Z32.00 ENCOUNTER FOR PREGNANCY TEST, RESULT UNKNOWN: Primary | ICD-10-CM

## 2023-07-26 DIAGNOSIS — O36.80X0 PREGNANCY WITH INCONCLUSIVE FETAL VIABILITY, SINGLE OR UNSPECIFIED FETUS: ICD-10-CM

## 2023-07-26 NOTE — TELEPHONE ENCOUNTER
Patient called this pm.  She has gotten a postive preg.  We scheduled her 1st available RGOB 8/24/23.  [id number].  Per protocol I have attached an order for a Ultrasound & BHCG

## 2023-07-26 NOTE — TELEPHONE ENCOUNTER
Caller: Dominique Rosa    Relationship to patient: Self    Best call back number: [phone]    Patient is needing: new ob patient calling to schedule u/s and blood work. Please advise

## 2023-07-31 ENCOUNTER — LAB (OUTPATIENT)
Dept: OBSTETRICS AND GYNECOLOGY | Facility: CLINIC | Age: 33
End: 2023-07-31
Payer: COMMERCIAL

## 2023-07-31 DIAGNOSIS — Z32.00 ENCOUNTER FOR PREGNANCY TEST, RESULT UNKNOWN: ICD-10-CM

## 2023-07-31 LAB — HCG INTACT+B SERPL-ACNC: NORMAL MIU/ML

## 2023-07-31 PROCEDURE — 36415 COLL VENOUS BLD VENIPUNCTURE: CPT | Performed by: OBSTETRICS & GYNECOLOGY

## 2023-07-31 PROCEDURE — 84702 CHORIONIC GONADOTROPIN TEST: CPT | Performed by: OBSTETRICS & GYNECOLOGY

## 2023-08-16 ENCOUNTER — HOSPITAL ENCOUNTER (OUTPATIENT)
Dept: ULTRASOUND IMAGING | Facility: HOSPITAL | Age: 33
Discharge: HOME | End: 2023-08-16
Admitting: OBSTETRICS & GYNECOLOGY
Payer: OTHER GOVERNMENT

## 2023-08-16 DIAGNOSIS — O36.80X0 PREGNANCY WITH INCONCLUSIVE FETAL VIABILITY, SINGLE OR UNSPECIFIED FETUS: ICD-10-CM

## 2023-08-16 PROCEDURE — 76801 OB US < 14 WKS SINGLE FETUS: CPT

## 2023-08-22 PROBLEM — Z34.80 SUPERVISION OF OTHER NORMAL PREGNANCY, ANTEPARTUM: Status: ACTIVE | Noted: 2023-08-22

## 2023-08-22 PROBLEM — Z01.419 WELL WOMAN EXAM WITH ROUTINE GYNECOLOGICAL EXAM: Status: RESOLVED | Noted: 2021-11-11 | Resolved: 2023-08-22

## 2023-08-22 NOTE — PROGRESS NOTES
OBSTETRIC HISTORY AND PHYSICAL     Subjective:  Dominique Thapa is a 33 y.o.  at 14w4d here for her new OB visit. Patient pregnancy is dated by a LMP and confirmed with US. Patient's pregnancy is complicated by h/o macrosomia infant(G2).  She is taking her prenatal vitamins.Reports no loss of fluid or vaginal bleeding.No hx of genetic, bleeding, endocrine, chromosome disorder in both patient and partner. No history of multiple gestations, congenital anomalies or mental retardation.    FOB involvement: yes  Pediatrician: yes  Breast/Bottle: breast   Epidural: no    Discussed adequate water intake, food guidelines/weight gain, limit caffiene to less than 200mg daily.   Discussed food, activities to avoid. Discussed seatbelt safety.   Reviewed safe meds in pregnancy handout.  Taking PNV: yes  Smoking cessation needed: no     Reviewed and updated:  OBHx, GYNHx (STDs), PMHx, Medications, Allergies, PSHx, Social Hx, Preventative Hx (PAP), Hx of abuse/safe environment, Vaccine Hx including hx of chickenpox or vaccine, Genetic Hx (pt, FOB, both families).        OB History    Para Term  AB Living   4 2 2 0 1 2   SAB IAB Ectopic Molar Multiple Live Births   1 0 0 0 0 2      # Outcome Date GA Lbr Moise/2nd Weight Sex Delivery Anes PTL Lv   4 Current            3 Term 21 40w3d 28:40 / 00:09 4500 g (9 lb 14.7 oz) F Vag-Spont None N LYN   2 Term 20 40w0d  3629 g (8 lb) F Vag-Spont   LYN   1 SAB  9w0d            Past Medical History:   Diagnosis Date    Anemia     Mastitis     Ovarian cyst      Past Surgical History:   Procedure Laterality Date    BREAST ABSCESS INCISION AND DRAINAGE       Family History   Problem Relation Age of Onset    Ovarian cancer Mother     Heart disease Mother     Thyroid disease Maternal Grandmother     Breast cancer Paternal Aunt 30    Uterine cancer Neg Hx     Colon cancer Neg Hx     Prostate cancer Neg Hx     Pulmonary embolism Neg Hx     Deep vein  thrombosis Neg Hx      No Known Allergies  Social History     Socioeconomic History    Marital status:    Tobacco Use    Smoking status: Never    Smokeless tobacco: Never   Vaping Use    Vaping Use: Never used   Substance and Sexual Activity    Alcohol use: Not Currently    Drug use: No    Sexual activity: Yes     Partners: Male     Birth control/protection: None     Comment:            ROS:  General ROS: negative for - chills or fatigue  Respiratory ROS: negative for - cough or hemoptysis  Cardiovascular ROS: negative for - chest pain or dyspnea on exertion  Gastrointestinal ROS: negative for - abdominal pain or appetite loss  Musculoskeletal ROS: negative for - gait disturbance or joint pain  Neurological ROS: negative for - behavioral changes or bowel and bladder control changes  Dermatological ROS: negative for rashes or lesions     Objective:  Physical Exam:   Vitals:    08/24/23 0939   BP: 113/73       General appearance - alert, well appearing, and in no distress  Mental status - alert, oriented to person, place, and time  Neck- Supple.  No nodularity or enlargement.  Heart- Regular rate and rhythm without murmur, gallop or rub.  Lungs- Clear to auscultation bilaterally, negative W/R/R  Breasts- Deferred to annual  Abdomen- Soft, Gravid uterus, non-tender  Extremeties: Normal ROM, Negative swelling or cyanosis  Neurological: Gait normal, Negative tingling or loss of sensation     Pelvic exam: pap smear up to date      Counseling:   Nutrition discussed, calories, activity/exercise in pregnancy  Discussed dietary restrictions/safety food preparation in pregnancy  Reviewed what to expect prenatal visits, office providers and [facility] hospitalists  Appropriate trimester precautions provided, N/V, vag bleeding, cramping  Questions answered  VACCINE importance in pregnancy discussed.  Maternal and fetal risk of not being vaccinated reviewed NLT increased risk maternal/fetal severity of illness/death, PTD,  CS, hemorrhage, HTN, possible IUFD.  Significant maternal and fetal/infant benefit w vaccination.  FDA approval and ACOG/SMFM/CDC strong recommendation in pregnancy.  Questions answered.   ANXIETY/DEPRESSION- We discussed treatment options R/B/A/SE/E expectant, THERAPY, SSRI, vs SSRI + Therapy.  Non medical options usually recommended first.  JUNIOR reviewed.  Ideally weaning in third tri if possible. Some studies indicate child w increased risk Dep/Anx w SSRI use in preg.  Black box warning.  Recommend avoid abrupt discontinuation.  Discussed healthy weight gain.  Also discussed increased water intake, 200mg of caffeine daily, exercise and healthy eating habits.    Encouraged patient to consider breastfeeding. Discussed that it is recommended by the CDC and WHO that women exclusively breastfeed for the first 6 months and continue to breastfeed up to one year. Discussed the health benefits of breastfeeding, including increased immune systems in infants, reduced risk of food allergies, and reduced risk of chronic disease as an adult. Maternal benefits include more PP weight loss, reduced risk of PP depression, breast/ovarian cancer risk reduced.     ASSESSMENT/PLAN:   Diagnoses and all orders for this visit:    1. Supervision of other normal pregnancy, antepartum (Primary)  -     Hemoglobin A1c  -     Hemoglobinopathy Fractionation Cascade  -     Chlamydia trachomatis, Neisseria gonorrhoeae, PCR - Urine, Urine, Random Void  -     OB Panel With HIV  -     POC Urinalysis Dipstick  -     Urine Culture - Urine, Urine, Clean Catch  -     Urine Drug Screen - Urine, Clean Catch  -     US Ob 14 + Weeks Single or First Gestation; Future    2. History of macrosomia in infant in prior pregnancy, currently pregnant in third trimester  Assessment & Plan:  Hx of macrosomia pt-10lb G2-recommend early GTT    Orders:  -     Gestational Diabetes Screen 1 Hour        Problems Addressed this Visit          Gravid and      Supervision of other normal pregnancy, antepartum - Primary    Relevant Orders    Hemoglobin A1c    Hemoglobinopathy Fractionation Cascade    Chlamydia trachomatis, Neisseria gonorrhoeae, PCR - Urine, Urine, Random Void    OB Panel With HIV    POC Urinalysis Dipstick (Completed)    Urine Culture - Urine, Urine, Clean Catch    Urine Drug Screen - Urine, Clean Catch    US Ob 14 + Weeks Single or First Gestation    History of macrosomia in infant in prior pregnancy, currently pregnant in third trimester     Hx of macrosomia pt-10lb G2-recommend early GTT         Relevant Orders    Gestational Diabetes Screen 1 Hour     Diagnoses         Codes Comments    Supervision of other normal pregnancy, antepartum    -  Primary ICD-10-CM: Z34.80  ICD-9-CM: V22.1     History of macrosomia in infant in prior pregnancy, currently pregnant in third trimester     ICD-10-CM: O09.293  ICD-9-CM: V23.49                     Return in about 4 weeks (around 9/21/2023) for Routine Ob visit with Anatomy scan.    We have gone over the expected prenatal care to include the timing and content of visits including the anatomy ultrasound.  We discussed the content of the anatomy ultrasound and its limitations (the fact that ultrasound in general may not see up to 40% of abnormalities).  I informed her how to contact the office and/or on call person in the event of any problems and encouraged her to do so when she feels it is necessary.  We then spent time answering her questions which she indicated were answered to her satisfaction.    Amparo Hogan DO  8/24/2023 09:58 EDT

## 2023-08-24 ENCOUNTER — INITIAL PRENATAL (OUTPATIENT)
Dept: OBSTETRICS AND GYNECOLOGY | Facility: CLINIC | Age: 33
End: 2023-08-24
Payer: COMMERCIAL

## 2023-08-24 ENCOUNTER — TELEPHONE (OUTPATIENT)
Dept: OBSTETRICS AND GYNECOLOGY | Facility: CLINIC | Age: 33
End: 2023-08-24
Payer: COMMERCIAL

## 2023-08-24 VITALS — DIASTOLIC BLOOD PRESSURE: 73 MMHG | WEIGHT: 142.8 LBS | BODY MASS INDEX: 23.05 KG/M2 | SYSTOLIC BLOOD PRESSURE: 113 MMHG

## 2023-08-24 DIAGNOSIS — O09.293 HISTORY OF MACROSOMIA IN INFANT IN PRIOR PREGNANCY, CURRENTLY PREGNANT IN THIRD TRIMESTER: ICD-10-CM

## 2023-08-24 DIAGNOSIS — Z34.80 SUPERVISION OF OTHER NORMAL PREGNANCY, ANTEPARTUM: Primary | ICD-10-CM

## 2023-08-24 LAB
ABO GROUP BLD: NORMAL
AMPHET+METHAMPHET UR QL: POSITIVE
BARBITURATES UR QL SCN: NEGATIVE
BASOPHILS # BLD AUTO: 0.02 10*3/MM3 (ref 0–0.2)
BASOPHILS NFR BLD AUTO: 0.2 % (ref 0–1.5)
BENZODIAZ UR QL SCN: NEGATIVE
BLD GP AB SCN SERPL QL: NEGATIVE
C TRACH RRNA CVX QL NAA+PROBE: NOT DETECTED
CANNABINOIDS SERPL QL: NEGATIVE
COCAINE UR QL: NEGATIVE
DEPRECATED RDW RBC AUTO: 39.9 FL (ref 37–54)
EOSINOPHIL # BLD AUTO: 0.13 10*3/MM3 (ref 0–0.4)
EOSINOPHIL NFR BLD AUTO: 1.3 % (ref 0.3–6.2)
ERYTHROCYTE [DISTWIDTH] IN BLOOD BY AUTOMATED COUNT: 13 % (ref 12.3–15.4)
FENTANYL UR-MCNC: NEGATIVE NG/ML
GLUCOSE 1H P GLC SERPL-MCNC: 130 MG/DL (ref 65–139)
GLUCOSE UR STRIP-MCNC: NEGATIVE MG/DL
HBA1C MFR BLD: 5.2 % (ref 4.8–5.6)
HBV SURFACE AG SERPL QL IA: NORMAL
HCT VFR BLD AUTO: 36.6 % (ref 34–46.6)
HCV AB SER DONR QL: NORMAL
HGB BLD-MCNC: 12.4 G/DL (ref 12–15.9)
HIV1+2 AB SER QL: NORMAL
IMM GRANULOCYTES # BLD AUTO: 0.05 10*3/MM3 (ref 0–0.05)
IMM GRANULOCYTES NFR BLD AUTO: 0.5 % (ref 0–0.5)
LYMPHOCYTES # BLD AUTO: 1.76 10*3/MM3 (ref 0.7–3.1)
LYMPHOCYTES NFR BLD AUTO: 17.4 % (ref 19.6–45.3)
MCH RBC QN AUTO: 28.9 PG (ref 26.6–33)
MCHC RBC AUTO-ENTMCNC: 33.9 G/DL (ref 31.5–35.7)
MCV RBC AUTO: 85.3 FL (ref 79–97)
METHADONE UR QL SCN: NEGATIVE
MONOCYTES # BLD AUTO: 0.77 10*3/MM3 (ref 0.1–0.9)
MONOCYTES NFR BLD AUTO: 7.6 % (ref 5–12)
N GONORRHOEA RRNA SPEC QL NAA+PROBE: NOT DETECTED
NEUTROPHILS NFR BLD AUTO: 7.39 10*3/MM3 (ref 1.7–7)
NEUTROPHILS NFR BLD AUTO: 73 % (ref 42.7–76)
NRBC BLD AUTO-RTO: 0 /100 WBC (ref 0–0.2)
OPIATES UR QL: NEGATIVE
OXYCODONE UR QL SCN: NEGATIVE
PLATELET # BLD AUTO: 259 10*3/MM3 (ref 140–450)
PMV BLD AUTO: 12 FL (ref 6–12)
PROT UR STRIP-MCNC: NEGATIVE MG/DL
RBC # BLD AUTO: 4.29 10*6/MM3 (ref 3.77–5.28)
RH BLD: POSITIVE
T PALLIDUM IGG SER QL: NORMAL
WBC NRBC COR # BLD: 10.12 10*3/MM3 (ref 3.4–10.8)

## 2023-08-24 PROCEDURE — 85025 COMPLETE CBC W/AUTO DIFF WBC: CPT | Performed by: OBSTETRICS & GYNECOLOGY

## 2023-08-24 PROCEDURE — G0432 EIA HIV-1/HIV-2 SCREEN: HCPCS | Performed by: OBSTETRICS & GYNECOLOGY

## 2023-08-24 PROCEDURE — 87491 CHLMYD TRACH DNA AMP PROBE: CPT | Performed by: OBSTETRICS & GYNECOLOGY

## 2023-08-24 PROCEDURE — 80307 DRUG TEST PRSMV CHEM ANLYZR: CPT | Performed by: OBSTETRICS & GYNECOLOGY

## 2023-08-24 PROCEDURE — 83036 HEMOGLOBIN GLYCOSYLATED A1C: CPT | Performed by: OBSTETRICS & GYNECOLOGY

## 2023-08-24 PROCEDURE — 86901 BLOOD TYPING SEROLOGIC RH(D): CPT | Performed by: OBSTETRICS & GYNECOLOGY

## 2023-08-24 PROCEDURE — 86900 BLOOD TYPING SEROLOGIC ABO: CPT | Performed by: OBSTETRICS & GYNECOLOGY

## 2023-08-24 PROCEDURE — 87591 N.GONORRHOEAE DNA AMP PROB: CPT | Performed by: OBSTETRICS & GYNECOLOGY

## 2023-08-24 PROCEDURE — 86803 HEPATITIS C AB TEST: CPT | Performed by: OBSTETRICS & GYNECOLOGY

## 2023-08-24 PROCEDURE — 82950 GLUCOSE TEST: CPT | Performed by: OBSTETRICS & GYNECOLOGY

## 2023-08-24 PROCEDURE — 86850 RBC ANTIBODY SCREEN: CPT | Performed by: OBSTETRICS & GYNECOLOGY

## 2023-08-24 PROCEDURE — 87086 URINE CULTURE/COLONY COUNT: CPT | Performed by: OBSTETRICS & GYNECOLOGY

## 2023-08-24 PROCEDURE — 86780 TREPONEMA PALLIDUM: CPT | Performed by: OBSTETRICS & GYNECOLOGY

## 2023-08-24 PROCEDURE — 87340 HEPATITIS B SURFACE AG IA: CPT | Performed by: OBSTETRICS & GYNECOLOGY

## 2023-08-24 RX ORDER — PRENATAL VIT/IRON FUM/FOLIC AC 27MG-0.8MG
TABLET ORAL DAILY
COMMUNITY

## 2023-08-24 NOTE — PATIENT INSTRUCTIONS
Venipuncture Blood Specimen Collection  Venipuncture performed in left arm by Yareli Sanderson with good hemostasis. Patient tolerated the procedure well without complications.   08/24/23   Yareli Sanderson

## 2023-08-24 NOTE — ASSESSMENT & PLAN NOTE
JACOB finalize:Estimated Date of Delivery: Estimated Date of Delivery: 2/18/24 based on LMP and confirmed with BSUS     Genetic testing (NIPS-Quad)/CF/AFP:  Declined     COVID: Recommended   Flu: Recommended   Tdap:Script 27-36 weeks     Rhogam: A Positive    Sterilization:?    Anatomy US:Ordered   FU US:    EPDS:5    PROBLEM LIST/PLAN:   Hx of macrosomia

## 2023-08-25 PROBLEM — R82.5 POSITIVE URINE DRUG SCREEN: Status: ACTIVE | Noted: 2023-08-25

## 2023-08-25 LAB
BACTERIA SPEC AEROBE CULT: NO GROWTH
HGB A MFR BLD ELPH: 97.4 % (ref 96.4–98.8)
HGB A2 MFR BLD ELPH: 2.6 % (ref 1.8–3.2)
HGB F MFR BLD ELPH: 0 % (ref 0–2)
HGB FRACT BLD-IMP: NORMAL
HGB S MFR BLD ELPH: 0 %
RUBV IGG SERPL IA-ACNC: 1.24 INDEX

## 2023-08-28 ENCOUNTER — TELEPHONE (OUTPATIENT)
Dept: OBSTETRICS AND GYNECOLOGY | Facility: CLINIC | Age: 33
End: 2023-08-28
Payer: COMMERCIAL

## 2023-08-28 DIAGNOSIS — Z34.80 SUPERVISION OF OTHER NORMAL PREGNANCY, ANTEPARTUM: Primary | ICD-10-CM

## 2023-08-28 DIAGNOSIS — R82.5 POSITIVE URINE DRUG SCREEN: ICD-10-CM

## 2023-08-28 NOTE — TELEPHONE ENCOUNTER
Patient called after seeing her UDS on my chart was positive for amphetamines. Her initial OB appt was on  08/24 and her next appt is 06/25. She states she has never done drugs and only takes her PNV daily. She is requesting to come in for a repeat drug screen before her next appointment please  advise.

## 2023-08-29 ENCOUNTER — CLINICAL SUPPORT (OUTPATIENT)
Dept: OBSTETRICS AND GYNECOLOGY | Facility: CLINIC | Age: 33
End: 2023-08-29
Payer: COMMERCIAL

## 2023-08-29 DIAGNOSIS — R82.5 POSITIVE URINE DRUG SCREEN: ICD-10-CM

## 2023-08-29 LAB
AMPHET+METHAMPHET UR QL: NEGATIVE
BARBITURATES UR QL SCN: NEGATIVE
BENZODIAZ UR QL SCN: NEGATIVE
CANNABINOIDS SERPL QL: NEGATIVE
COCAINE UR QL: NEGATIVE
FENTANYL UR-MCNC: NEGATIVE NG/ML
METHADONE UR QL SCN: NEGATIVE
OPIATES UR QL: NEGATIVE
OXYCODONE UR QL SCN: NEGATIVE

## 2023-08-29 PROCEDURE — 80307 DRUG TEST PRSMV CHEM ANLYZR: CPT | Performed by: OBSTETRICS & GYNECOLOGY

## 2023-09-22 DIAGNOSIS — Z34.82 ENCOUNTER FOR SUPERVISION OF OTHER NORMAL PREGNANCY IN SECOND TRIMESTER: Primary | ICD-10-CM

## 2023-09-25 ENCOUNTER — ROUTINE PRENATAL (OUTPATIENT)
Dept: OBSTETRICS AND GYNECOLOGY | Facility: CLINIC | Age: 33
End: 2023-09-25

## 2023-09-25 VITALS — DIASTOLIC BLOOD PRESSURE: 57 MMHG | WEIGHT: 151 LBS | SYSTOLIC BLOOD PRESSURE: 98 MMHG | BODY MASS INDEX: 24.37 KG/M2

## 2023-09-25 DIAGNOSIS — Z34.80 SUPERVISION OF OTHER NORMAL PREGNANCY, ANTEPARTUM: Primary | ICD-10-CM

## 2023-09-25 LAB
GLUCOSE UR STRIP-MCNC: NEGATIVE MG/DL
PROT UR STRIP-MCNC: NEGATIVE MG/DL

## 2023-09-25 NOTE — PROGRESS NOTES
OB FOLLOW UP  CC- Here for care of pregnancy        Dominique Thapa is a 33 y.o.  19w1d patient being seen today for her obstetrical follow up visit. Patient reports no complaints.     Her prenatal care is complicated by (and status) :    Patient Active Problem List   Diagnosis    Supervision of other normal pregnancy, antepartum    History of macrosomia in infant in prior pregnancy, currently pregnant in third trimester    Positive urine drug screen       Flu Status: Desires at future appt  Covid Status:    ROS -   Patient Reports : No Problems  Patient Denies: Loss of Fluid and Vaginal Spotting  Fetal Movement : normal  All other systems reviewed and are negative.       The additional following portions of the patient's history were reviewed and updated as appropriate: allergies, current medications, past family history, past medical history, past social history, past surgical history, and problem list.    I have reviewed and agree with the HPI, ROS, and historical information as entered above. Leann Moulton, DO    BP 98/57   Wt 68.5 kg (151 lb)   LMP 2023 Comment: 2 MONTHS AGO  BMI 24.37 kg/m²       EXAM:     FHT: 151 BPM   Uterine Size: size equals dates  Pelvic Exam: No    Urine glucose/protein: See prenatal flowsheet       Assessment and Plan  Diagnoses and all orders for this visit:    1. Supervision of other normal pregnancy, antepartum (Primary)  Overview:  JACOB finalize:Estimated Date of Delivery: Estimated Date of Delivery: 24 based on LMP and confirmed with BSUS     Genetic testing (NIPS-Quad)/CF/AFP:  Declined     COVID: Recommended   Flu: Recommended   Tdap:Script 27-36 weeks     Rhogam: A Positive    Sterilization:    Anatomy US:23: JACOB confirmed.  SIUP with cardiac activity @ 151, Breech, Anterior placenta, Grade 1, female.  Limited views of profile  FU US:Ordered    EPDS:5    PROBLEM LIST/PLAN:   Hx of macrosomia       Orders:  -     POC Urinalysis Dipstick  -     US  Ob 14 + Weeks Single or First Gestation; Future         Pregnancy at 19w1d  Fetal status reassuring.   Activity and Exercise discussed.  Return in about 4 weeks (around 10/23/2023) for rotate providers,pls sched appt and sono 8 wks to ck heart.    Leann Moulton, DO  09/25/2023

## 2023-10-22 NOTE — ASSESSMENT & PLAN NOTE
JACOB finalize:Estimated Date of Delivery: Estimated Date of Delivery: 2/18/24 based on LMP and confirmed with BSUS     Genetic testing (NIPS-Quad)/CF/AFP:  Declined     COVID: Recommended   Flu: Recommended   Tdap:Script 27-36 weeks   RSV: script 32-36 weeks     Rhogam: A Positive    Sterilization:    Anatomy US:9/25/23: JACOB confirmed.  SIUP with cardiac activity @ 151, Breech, Anterior placenta, Grade 1, female.  Limited views of the face and profile   FU US:Ordered    EPDS:5    PROBLEM LIST/PLAN:   Hx of macrosomia   UDS+ Meth new OB visit

## 2023-10-22 NOTE — PROGRESS NOTES
Routine Prenatal Visit     Subjective  Dominique Thapa is a 33 y.o.  at 23w1d here for her routine OB visit.   She is taking her prenatal vitamins.Reports no loss of fluid or vaginal bleeding. Patient doing well without any complaints. Pregnancy is complicated by:     Patient Active Problem List   Diagnosis    Supervision of other normal pregnancy, antepartum    History of macrosomia in infant in prior pregnancy, currently pregnant in third trimester    Positive urine drug screen         OB History    Para Term  AB Living   4 2 2 0 1 2   SAB IAB Ectopic Molar Multiple Live Births   1 0 0 0 0 2      # Outcome Date GA Lbr Moise/2nd Weight Sex Delivery Anes PTL Lv   4 Current            3 Term 21 40w3d 28:40 / 00:09 4500 g (9 lb 14.7 oz) F Vag-Spont None N LYN   2 Term 20 40w0d  3629 g (8 lb) F Vag-Spont   LYN   1 SAB  9w0d                  ROS:   General ROS: negative for - chills or fatigue  Respiratory ROS: negative for - cough or hemoptysis  Cardiovascular ROS: negative for - chest pain or dyspnea on exertion  Genito-Urinary ROS: negative for  change in urinary stream, vaginal discharge   Musculoskeletal ROS: negative for - gait disturbance or joint pain  Dermatological ROS: negative for acne,  dry skin or itching    Objective  Physical Exam:   Vitals:    10/23/23 0848   BP: 126/74       Uterine Size: size equals dates  FHT: 110-160 BPM    General appearance - alert, well appearing, and in no distress  Mental status - alert, oriented to person, place, and time  Abdomen- Soft, Gravid uterus, non-tender to palpation  Musculoskeletal: negative for - gait disturbance or joint pain  Extremeties: negative swelling or cyanosis   Dermatological: negative rashes or skin lesions     Assessment/Plan:   Diagnoses and all orders for this visit:    1. Supervision of other normal pregnancy, antepartum (Primary)  Assessment & Plan:  JACOB finalize:Estimated Date of Delivery: Estimated Date  of Delivery: 2/18/24 based on LMP and confirmed with BSUS     Genetic testing (NIPS-Quad)/CF/AFP:  Declined     COVID: Recommended   Flu: Recommended   Tdap:Script 27-36 weeks   RSV: script 32-36 weeks     Rhogam: A Positive    Sterilization:    Anatomy US:9/25/23: JACOB confirmed.  SIUP with cardiac activity @ 151, Breech, Anterior placenta, Grade 1, female.  Limited views of the face and profile   FU US:Ordered    EPDS:5    PROBLEM LIST/PLAN:   Hx of macrosomia   UDS+ Meth new OB visit     Orders:  -     POC Urinalysis Dipstick    2. History of macrosomia in infant in prior pregnancy, currently pregnant in third trimester  Assessment & Plan:  Early , repeat 24-28 weeks       3. Positive urine drug screen  Assessment & Plan:  UDS + new OB    Orders:  -     Urine Drug Screen - Urine, Clean Catch          Counseling:   Second trimester precautions  Round Ligament Pain:  The uterus has several ligaments which provide support and keep the uterus in place. As the  uterus grows these ligaments are pulled and stretched which often causes sharp stabbing like pain in the inguinal area.   You may find a pregnancy support band helpful. Changing positions may also help. Yoga is a great way to cope with round ligament and low back pain in pregnancy.    Massage may also help with low back pain   Things to Consider at this Point in your Pregnancy:  Some women experience swelling in their feet during pregnancy. Compression stockings may help  Drink plenty of water and stay active   Make sure you are eating frequent small meals, nuts are a wonderful snack to keep with you            Return in about 4 weeks (around 11/20/2023) for Routine OB visit.      We have gone over prenatal care to include the timing and content of visits. I informed her how to contact the office and/or on call person in the event of any problems and encouraged her to do so when she feels it is necessary.  We then spent time answering her questions which  she indicated were answered to her satisfaction.    Amparo Hogan DO  10/23/2023 09:09 EDT

## 2023-10-23 ENCOUNTER — ROUTINE PRENATAL (OUTPATIENT)
Dept: OBSTETRICS AND GYNECOLOGY | Facility: CLINIC | Age: 33
End: 2023-10-23
Payer: OTHER GOVERNMENT

## 2023-10-23 VITALS — BODY MASS INDEX: 25.28 KG/M2 | WEIGHT: 156.6 LBS | DIASTOLIC BLOOD PRESSURE: 74 MMHG | SYSTOLIC BLOOD PRESSURE: 126 MMHG

## 2023-10-23 DIAGNOSIS — O09.293 HISTORY OF MACROSOMIA IN INFANT IN PRIOR PREGNANCY, CURRENTLY PREGNANT IN THIRD TRIMESTER: ICD-10-CM

## 2023-10-23 DIAGNOSIS — R82.5 POSITIVE URINE DRUG SCREEN: ICD-10-CM

## 2023-10-23 DIAGNOSIS — Z34.80 SUPERVISION OF OTHER NORMAL PREGNANCY, ANTEPARTUM: Primary | ICD-10-CM

## 2023-10-23 LAB
AMPHET+METHAMPHET UR QL: NEGATIVE
BARBITURATES UR QL SCN: NEGATIVE
BENZODIAZ UR QL SCN: NEGATIVE
CANNABINOIDS SERPL QL: NEGATIVE
COCAINE UR QL: NEGATIVE
FENTANYL UR-MCNC: NEGATIVE NG/ML
GLUCOSE UR STRIP-MCNC: NEGATIVE MG/DL
METHADONE UR QL SCN: NEGATIVE
OPIATES UR QL: NEGATIVE
OXYCODONE UR QL SCN: NEGATIVE
PROT UR STRIP-MCNC: NEGATIVE MG/DL

## 2023-10-23 PROCEDURE — 80307 DRUG TEST PRSMV CHEM ANLYZR: CPT | Performed by: OBSTETRICS & GYNECOLOGY

## 2023-10-23 PROCEDURE — 0502F SUBSEQUENT PRENATAL CARE: CPT | Performed by: OBSTETRICS & GYNECOLOGY

## 2023-11-16 NOTE — PROGRESS NOTES
OB FOLLOW UP      Chief Complaint   Patient presents with    Routine Prenatal Visit     Subjective:   No complaints    Objective:  /64   Wt 73.9 kg (163 lb)   LMP 05/14/2023 Comment: 2 MONTHS AGO  BMI 26.31 kg/m²  9.163 kg (20 lb 3.2 oz) Facility age limit for growth %tim is 20 years.  See OB flow sheet for fundal height (not performed if US day of OV), FHT, edema, cvx exam if performed, and Upro/Uglu      Assessment and Plan:  27w1d   Reassuring pregnancy progress.  Questions answered.  Diagnoses and all orders for this visit:    1. Supervision of other normal pregnancy, antepartum (Primary)  Overview:  JACOB finalized:2/18/24 based on LMP and confirmed with BSUS 13wks    Genetic testing (NIPS-Quad)/CF/AFP:  Declined     COVID: Recommended   Flu: Recommended   Tdap: Recommended.  S/p Rx 11/20/2023   RSV:     Sterilization:  Declines 11/20/2023     Anatomy US:9/25/23: JACOB confirmed.  SIUP with cardiac activity @ 151, Breech, Anterior placenta, Grade 1, female.  Limited views of the face and profile   FU US: [facility] 11/20/23 2#5oz, 45%, AC 18%, MARIETTA 11, nl face and cardiac views, VTX     EPDS:5    PROBLEM LIST/PLAN:   Hx of macrosomia - 9#15oz, uncomplicated.  Early glu 130.  FU US growth  Hx of GDM- early glu 130  UDS+ Meth new OB visit - Possible false positive, repeat FU UDS x2 neg      Orders:  -     POC Urinalysis Dipstick  -     CBC (No Diff)  -     Gestational Diabetes Screen 1 Hour      Counseling:    OB precautions, leaking, VB, talha martines vs PTL/Labor  FKC  VACCINE importance in pregnancy discussed.  Maternal and fetal risk of not being vaccinated reviewed NLT increased risk maternal/fetal severity of illness/death, PTD, CS, hemorrhage, HTN, possible IUFD.  Significant maternal and fetal/infant benefit w vaccination.  FDA approval and ACOG/SMFM/CDC strong recommendation in pregnancy.  Questions answered.     Continue PNV.  Importance of healthy eating, obtaining sufficient sleep, and staying active  unless hypertensive- activity modified as directed.    Return in about 2 weeks (around 12/4/2023) for FU OB q2wks to 36weeks.            Ana Lennon,   11/20/2023    [facility]  [address] [place] [address]  Dept: [phone]  Dept Fax: [phone]  Loc: [phone]  Loc Fax: [phone]

## 2023-11-20 ENCOUNTER — ROUTINE PRENATAL (OUTPATIENT)
Dept: OBSTETRICS AND GYNECOLOGY | Facility: CLINIC | Age: 33
End: 2023-11-20
Payer: OTHER GOVERNMENT

## 2023-11-20 VITALS — SYSTOLIC BLOOD PRESSURE: 102 MMHG | BODY MASS INDEX: 26.31 KG/M2 | WEIGHT: 163 LBS | DIASTOLIC BLOOD PRESSURE: 64 MMHG

## 2023-11-20 DIAGNOSIS — Z34.80 SUPERVISION OF OTHER NORMAL PREGNANCY, ANTEPARTUM: Primary | ICD-10-CM

## 2023-11-20 PROBLEM — R73.09 ELEVATED GLUCOSE TOLERANCE TEST: Status: ACTIVE | Noted: 2023-11-20

## 2023-11-20 PROBLEM — O99.019 ANEMIA OF MOTHER IN PREGNANCY, ANTEPARTUM: Status: ACTIVE | Noted: 2023-11-20

## 2023-11-20 LAB
DEPRECATED RDW RBC AUTO: 37.6 FL (ref 37–54)
ERYTHROCYTE [DISTWIDTH] IN BLOOD BY AUTOMATED COUNT: 12.4 % (ref 12.3–15.4)
GLUCOSE 1H P GLC SERPL-MCNC: 144 MG/DL (ref 65–139)
GLUCOSE UR STRIP-MCNC: NEGATIVE MG/DL
HCT VFR BLD AUTO: 30.5 % (ref 34–46.6)
HGB BLD-MCNC: 10.5 G/DL (ref 12–15.9)
MCH RBC QN AUTO: 29.1 PG (ref 26.6–33)
MCHC RBC AUTO-ENTMCNC: 34.4 G/DL (ref 31.5–35.7)
MCV RBC AUTO: 84.5 FL (ref 79–97)
PLATELET # BLD AUTO: 230 10*3/MM3 (ref 140–450)
PMV BLD AUTO: 11.5 FL (ref 6–12)
PROT UR STRIP-MCNC: NEGATIVE MG/DL
RBC # BLD AUTO: 3.61 10*6/MM3 (ref 3.77–5.28)
WBC NRBC COR # BLD AUTO: 10.92 10*3/MM3 (ref 3.4–10.8)

## 2023-11-20 PROCEDURE — 85027 COMPLETE CBC AUTOMATED: CPT | Performed by: OBSTETRICS & GYNECOLOGY

## 2023-11-20 PROCEDURE — 82950 GLUCOSE TEST: CPT | Performed by: OBSTETRICS & GYNECOLOGY

## 2023-11-20 NOTE — PATIENT INSTRUCTIONS
Venipuncture Blood Specimen Collection  Venipuncture performed in left arm by Lennie Norris with good hemostasis. Patient tolerated the procedure well without complications.   11/20/23   Lennie Norris

## 2023-11-21 ENCOUNTER — TELEPHONE (OUTPATIENT)
Dept: OBSTETRICS AND GYNECOLOGY | Facility: CLINIC | Age: 33
End: 2023-11-21
Payer: OTHER GOVERNMENT

## 2023-11-21 NOTE — TELEPHONE ENCOUNTER
----- Message from Ana Lennon, DO sent at 11/20/2023  3:59 PM EST -----  Elevated 1hr PG (130 or higher is ABNORMAL even if not flagged as such on Epic).    Please order and schedule 3hr GTT.

## 2023-11-22 ENCOUNTER — TELEPHONE (OUTPATIENT)
Dept: OBSTETRICS AND GYNECOLOGY | Facility: CLINIC | Age: 33
End: 2023-11-22
Payer: OTHER GOVERNMENT

## 2023-11-22 ENCOUNTER — TELEPHONE (OUTPATIENT)
Dept: OBSTETRICS AND GYNECOLOGY | Facility: CLINIC | Age: 33
End: 2023-11-22

## 2023-11-22 DIAGNOSIS — O99.019 ANEMIA OF MOTHER IN PREGNANCY, ANTEPARTUM: Primary | ICD-10-CM

## 2023-11-22 RX ORDER — FERROUS SULFATE 325(65) MG
325 TABLET ORAL EVERY OTHER DAY
Qty: 15 TABLET | Refills: 2 | Status: SHIPPED | OUTPATIENT
Start: 2023-11-22

## 2023-11-22 NOTE — TELEPHONE ENCOUNTER
----- Message from Ana Lennon DO sent at 11/20/2023  9:16 PM EST -----  Anemia.  Please send in Rx for iron QOD (every OTHER day) per protocol to her pharmacy, #15, 2RF.  I recommend increasing meat in her diet (especially red meat), iron fortified cereals and cooking in an iron skillet.

## 2023-11-22 NOTE — TELEPHONE ENCOUNTER
Attempted to call patient. Seemed as if someone picked up but the call then disconnected. Sent a my chart message letting pt know if iron being sent to pharmacy.

## 2023-11-22 NOTE — TELEPHONE ENCOUNTER
Hub staff attempted to follow warm transfer process and was unsuccessful     Caller: Dominique Hicks    Relationship to patient: Self    Best call back number: [phone]    Patient is needing: PT RETURNING MISSED CALL FROM OFFICE.

## 2023-12-03 NOTE — ASSESSMENT & PLAN NOTE
PROBLEM LIST/PLAN:   Hx of macrosomia/GDM - 9#15oz, uncomplicated.    FU US growth  Anemia of preg-Iron Rx 11/20/2023, continue  Elev 1hr PG- 3hr w one abn.  Recommend decrease CHO in diet, and cont exercise, limit wt gain in preg  UDS+ Meth new OB visit - Possible false positive, repeat FU UDS x2 neg

## 2023-12-03 NOTE — PROGRESS NOTES
Routine Prenatal Visit     Subjective  Dominique Hicks is a 33 y.o.  at 29w1d here for her routine OB visit.   She is taking her prenatal vitamins.Reports no loss of fluid or vaginal bleeding. Patient doing well without any complaints. Pregnancy complicated by:     Patient Active Problem List   Diagnosis    Supervision of other normal pregnancy, antepartum    History of macrosomia in infant in prior pregnancy, currently pregnant in third trimester    Positive urine drug screen    Elevated glucose tolerance test    Anemia of mother in pregnancy, antepartum         OB History    Para Term  AB Living   4 2 2 0 1 2   SAB IAB Ectopic Molar Multiple Live Births   1 0 0 0 0 2      # Outcome Date GA Lbr Moise/2nd Weight Sex Delivery Anes PTL Lv   4 Current            3 Term 21 40w3d 28:40 / 00:09 4500 g (9 lb 14.7 oz) F Vag-Spont None N LYN   2 Term 20 40w0d  3629 g (8 lb) F Vag-Spont   LYN   1 2017 9w0d                  ROS:   General ROS: negative for - chills or fatigue  Respiratory ROS: negative for - cough or hemoptysis  Cardiovascular ROS: negative for - chest pain or dyspnea on exertion  Genito-Urinary ROS: negative for  change in urinary stream, vaginal discharge   Musculoskeletal ROS: negative for - gait disturbance or joint pain  Dermatological ROS: negative for acne,  dry skin or itching    Objective  Physical Exam:   Vitals:    23 1300   BP: 100/68       Uterine Size: size equals dates  FHT: 110-160 BPM    General appearance - alert, well appearing, and in no distress  Mental status - alert, oriented to person, place, and time  Abdomen- Soft, Gravid uterus, non-tender to palpation  Musculoskeletal: negative for - gait disturbance or joint pain  Extremeties: negative swelling or cyanosis   Dermatological: negative rashes or skin lesions       Assessment/Plan:   Diagnoses and all orders for this visit:    1. Supervision of other normal pregnancy, antepartum  (Primary)  Assessment & Plan:  PROBLEM LIST/PLAN:   Hx of macrosomia/GDM - 9#15oz, uncomplicated.    FU US growth  Anemia of preg-Iron Rx 11/20/2023, continue  Elev 1hr PG- 3hr w one abn.  Recommend decrease CHO in diet, and cont exercise, limit wt gain in preg  UDS+ Meth new OB visit - Possible false positive, repeat FU UDS x2 neg    Orders:  -     POC Urinalysis Dipstick    2. Anemia of mother in pregnancy, antepartum    Other orders  -     SCANNED - LABS            Counseling:   OB precautions, leaking, VB, talha martines vs PTL/Labor  FKC  HTN precautions reviewed: HA, vision change, RUQ/epigastric pain, edema  Round Ligament Pain:  The uterus has several ligaments which provide support and keep the uterus in place. As the  uterus grows these ligaments are pulled and stretched which often causes sharp stabbing like pain in the inguinal area.   You may find a pregnancy support band helpful. Changing positions may also help. Yoga is a great way to cope with round ligament and low back pain in pregnancy.    Massage may also help with low back pain   Things to Consider at this Point in your Pregnancy:  Some women experience swelling in their feet during pregnancy. Compression stockings may help  Drink plenty of water and stay active   Make sure you are eating frequent small meals, nuts are a wonderful snack to keep with you            Return in about 2 weeks (around 12/18/2023) for Routine OB visit.      We have gone over prenatal care to include the timing and content of visits. I informed her how to contact the office and/or on call person in the event of any problems and encouraged her to do so when she feels it is necessary.  We then spent time answering her questions which she indicated were answered to her satisfaction.    Amparo Hogan,   12/4/2023 13:11 EST

## 2023-12-04 ENCOUNTER — TELEPHONE (OUTPATIENT)
Dept: OBSTETRICS AND GYNECOLOGY | Facility: CLINIC | Age: 33
End: 2023-12-04
Payer: OTHER GOVERNMENT

## 2023-12-04 VITALS — WEIGHT: 164.6 LBS | BODY MASS INDEX: 26.57 KG/M2 | SYSTOLIC BLOOD PRESSURE: 100 MMHG | DIASTOLIC BLOOD PRESSURE: 68 MMHG

## 2023-12-04 DIAGNOSIS — O99.019 ANEMIA OF MOTHER IN PREGNANCY, ANTEPARTUM: ICD-10-CM

## 2023-12-04 DIAGNOSIS — Z34.80 SUPERVISION OF OTHER NORMAL PREGNANCY, ANTEPARTUM: Primary | ICD-10-CM

## 2023-12-04 LAB
GLUCOSE UR STRIP-MCNC: NEGATIVE MG/DL
PROT UR STRIP-MCNC: NEGATIVE MG/DL

## 2023-12-04 PROCEDURE — 0502F SUBSEQUENT PRENATAL CARE: CPT | Performed by: OBSTETRICS & GYNECOLOGY

## 2023-12-04 NOTE — TELEPHONE ENCOUNTER
----- Message from Ana Lennon DO sent at 12/2/2023  4:36 PM EST -----  Only once abn in 3hr GTT.  She still is at higher risk, but does NOT have GDM.  I recommend she decrease carbs in diet, continue/start exercise, and limit wt gain in preg.

## 2023-12-04 NOTE — TELEPHONE ENCOUNTER
Good afternoon Dominique. Your three hour glucose test was normal. There was an elevated reading so that does put you at higher risk so it is recommended you decrease carbs in your diet, continue/start exercising and limit your weight gain in pregnancy. If you have any questions please let me know. Thank you.

## 2023-12-19 ENCOUNTER — ROUTINE PRENATAL (OUTPATIENT)
Dept: OBSTETRICS AND GYNECOLOGY | Facility: CLINIC | Age: 33
End: 2023-12-19
Payer: OTHER GOVERNMENT

## 2023-12-19 VITALS — BODY MASS INDEX: 27.31 KG/M2 | SYSTOLIC BLOOD PRESSURE: 96 MMHG | DIASTOLIC BLOOD PRESSURE: 66 MMHG | WEIGHT: 169.2 LBS

## 2023-12-19 DIAGNOSIS — O09.293 HISTORY OF MACROSOMIA IN INFANT IN PRIOR PREGNANCY, CURRENTLY PREGNANT IN THIRD TRIMESTER: ICD-10-CM

## 2023-12-19 DIAGNOSIS — Z34.80 SUPERVISION OF OTHER NORMAL PREGNANCY, ANTEPARTUM: Primary | ICD-10-CM

## 2023-12-19 LAB
GLUCOSE UR STRIP-MCNC: NEGATIVE MG/DL
PROT UR STRIP-MCNC: NEGATIVE MG/DL

## 2023-12-19 NOTE — PROGRESS NOTES
Routine Prenatal Visit     Subjective  Dominique Hicks is a 33 y.o.  at 31w2d here for her routine OB visit.   She is taking her prenatal vitamins.Reports no loss of fluid or vaginal bleeding. Patient doing well without any complaints. Pregnancy complicated by:     Patient Active Problem List   Diagnosis    Supervision of other normal pregnancy, antepartum    History of macrosomia in infant in prior pregnancy, currently pregnant in third trimester    Positive urine drug screen    Elevated glucose tolerance test    Anemia of mother in pregnancy, antepartum         OB History    Para Term  AB Living   4 2 2 0 1 2   SAB IAB Ectopic Molar Multiple Live Births   1 0 0 0 0 2      # Outcome Date GA Lbr Moise/2nd Weight Sex Delivery Anes PTL Lv   4 Current            3 Term 21 40w3d 28:40 / 00:09 4500 g (9 lb 14.7 oz) F Vag-Spont None N LYN   2 Term 20 40w0d  3629 g (8 lb) F Vag-Spont   LYN   1 2017 9w0d                  ROS:   General ROS: negative for - chills or fatigue  Respiratory ROS: negative for - cough or hemoptysis  Cardiovascular ROS: negative for - chest pain or dyspnea on exertion  Genito-Urinary ROS: negative for  change in urinary stream, vaginal discharge   Musculoskeletal ROS: negative for - gait disturbance or joint pain  Dermatological ROS: negative for acne,  dry skin or itching    Objective  Physical Exam:   Vitals:    23 1536   BP: 96/66       Uterine Size: size equals dates  FHT: 110-160 BPM    General appearance - alert, well appearing, and in no distress  Mental status - alert, oriented to person, place, and time  Abdomen- Soft, Gravid uterus, non-tender to palpation  Musculoskeletal: negative for - gait disturbance or joint pain  Extremeties: negative swelling or cyanosis   Dermatological: negative rashes or skin lesions       Assessment/Plan:   Diagnoses and all orders for this visit:    1. Supervision of other normal pregnancy, antepartum  (Primary)  Assessment & Plan:  Hx of macrosomia/GDM - 9#15oz, uncomplicated.    FU US growth  Anemia of preg-Iron Rx 11/20/2023, continue  Elev 1hr PG- 3hr w one abn.  Recommend decrease CHO in diet, and cont exercise, limit wt gain in preg  UDS+ Meth new OB visit - Possible false positive, repeat FU UDS x2 neg    Orders:  -     POC Urinalysis Dipstick    2. History of macrosomia in infant in prior pregnancy, currently pregnant in third trimester  -     US Ob 14 + Weeks Single or First Gestation; Future            Counseling:   OB precautions, leaking, VB, talha martines vs PTL/Labor  FKC  HTN precautions reviewed: HA, vision change, RUQ/epigastric pain, edema  Round Ligament Pain:  The uterus has several ligaments which provide support and keep the uterus in place. As the  uterus grows these ligaments are pulled and stretched which often causes sharp stabbing like pain in the inguinal area.   You may find a pregnancy support band helpful. Changing positions may also help. Yoga is a great way to cope with round ligament and low back pain in pregnancy.    Massage may also help with low back pain   Things to Consider at this Point in your Pregnancy:  Some women experience swelling in their feet during pregnancy. Compression stockings may help  Drink plenty of water and stay active   Make sure you are eating frequent small meals, nuts are a wonderful snack to keep with you            Return in about 2 weeks (around 1/2/2024) for Routine OB visit.      We have gone over prenatal care to include the timing and content of visits. I informed her how to contact the office and/or on call person in the event of any problems and encouraged her to do so when she feels it is necessary.  We then spent time answering her questions which she indicated were answered to her satisfaction.    Amparo Hogan, DO  12/19/2023 15:48 EST

## 2023-12-19 NOTE — ASSESSMENT & PLAN NOTE
Hx of macrosomia/GDM - 9#15oz, uncomplicated.    FU US growth  Anemia of preg-Iron Rx 11/20/2023, continue  Elev 1hr PG- 3hr w one abn.  Recommend decrease CHO in diet, and cont exercise, limit wt gain in preg  UDS+ Meth new OB visit - Possible false positive, repeat FU UDS x2 neg

## 2024-01-04 ENCOUNTER — ROUTINE PRENATAL (OUTPATIENT)
Dept: OBSTETRICS AND GYNECOLOGY | Facility: CLINIC | Age: 34
End: 2024-01-04
Payer: OTHER GOVERNMENT

## 2024-01-04 VITALS — DIASTOLIC BLOOD PRESSURE: 68 MMHG | SYSTOLIC BLOOD PRESSURE: 106 MMHG | BODY MASS INDEX: 28.28 KG/M2 | WEIGHT: 175.2 LBS

## 2024-01-04 DIAGNOSIS — Z34.80 SUPERVISION OF OTHER NORMAL PREGNANCY, ANTEPARTUM: Primary | ICD-10-CM

## 2024-01-04 DIAGNOSIS — O09.293 HISTORY OF MACROSOMIA IN INFANT IN PRIOR PREGNANCY, CURRENTLY PREGNANT IN THIRD TRIMESTER: ICD-10-CM

## 2024-01-04 DIAGNOSIS — O99.019 ANEMIA OF MOTHER IN PREGNANCY, ANTEPARTUM: ICD-10-CM

## 2024-01-04 LAB
GLUCOSE UR STRIP-MCNC: NEGATIVE MG/DL
PROT UR STRIP-MCNC: NEGATIVE MG/DL

## 2024-01-04 PROCEDURE — 0502F SUBSEQUENT PRENATAL CARE: CPT | Performed by: OBSTETRICS & GYNECOLOGY

## 2024-01-04 NOTE — PROGRESS NOTES
Routine Prenatal Visit     Subjective  Dominique Hicks is a 33 y.o.  at 33w4d here for her routine OB visit.   She is taking her prenatal vitamins.Reports no loss of fluid or vaginal bleeding. Patient doing well without any complaints. Pregnancy complicated by:     Patient Active Problem List   Diagnosis    Supervision of other normal pregnancy, antepartum    History of macrosomia in infant in prior pregnancy, currently pregnant in third trimester    Positive urine drug screen    Elevated glucose tolerance test    Anemia of mother in pregnancy, antepartum         OB History    Para Term  AB Living   4 2 2 0 1 2   SAB IAB Ectopic Molar Multiple Live Births   1 0 0 0 0 2      # Outcome Date GA Lbr Moise/2nd Weight Sex Delivery Anes PTL Lv   4 Current            3 Term 21 40w3d 28:40 / 00:09 4500 g (9 lb 14.7 oz) F Vag-Spont None N LYN   2 Term 20 40w0d  3629 g (8 lb) F Vag-Spont   LYN   1 2017 9w0d                  ROS:   General ROS: negative for - chills or fatigue  Respiratory ROS: negative for - cough or hemoptysis  Cardiovascular ROS: negative for - chest pain or dyspnea on exertion  Genito-Urinary ROS: negative for  change in urinary stream, vaginal discharge   Musculoskeletal ROS: negative for - gait disturbance or joint pain  Dermatological ROS: negative for acne,  dry skin or itching    Objective  Physical Exam:   Vitals:    24 1542   BP: 106/68       Uterine Size: size equals dates  FHT: 110-160 BPM    General appearance - alert, well appearing, and in no distress  Mental status - alert, oriented to person, place, and time  Abdomen- Soft, Gravid uterus, non-tender to palpation  Musculoskeletal: negative for - gait disturbance or joint pain  Extremeties: negative swelling or cyanosis   Dermatological: negative rashes or skin lesions     Assessment/Plan:   Diagnoses and all orders for this visit:    1. Supervision of other normal pregnancy, antepartum  (Primary)  -     POC Urinalysis Dipstick    2. History of macrosomia in infant in prior pregnancy, currently pregnant in third trimester    3. Anemia of mother in pregnancy, antepartum            Counseling:   OB precautions, leaking, VB, talha martines vs PTL/Labor  FKC  HTN precautions reviewed: HA, vision change, RUQ/epigastric pain, edema  Round Ligament Pain:  The uterus has several ligaments which provide support and keep the uterus in place. As the  uterus grows these ligaments are pulled and stretched which often causes sharp stabbing like pain in the inguinal area.   You may find a pregnancy support band helpful. Changing positions may also help. Yoga is a great way to cope with round ligament and low back pain in pregnancy.    Massage may also help with low back pain   Things to Consider at this Point in your Pregnancy:  Some women experience swelling in their feet during pregnancy. Compression stockings may help  Drink plenty of water and stay active   Make sure you are eating frequent small meals, nuts are a wonderful snack to keep with you            Return in about 2 weeks (around 1/18/2024) for Routine OB visit.      We have gone over prenatal care to include the timing and content of visits. I informed her how to contact the office and/or on call person in the event of any problems and encouraged her to do so when she feels it is necessary.  We then spent time answering her questions which she indicated were answered to her satisfaction.    Amparo Hogan,   1/4/2024 16:00 EST

## 2024-01-15 NOTE — PROGRESS NOTES
Routine Prenatal Visit     Subjective  Dominique Hicks is a 33 y.o.  at 35wwd here for her routine OB visit.   She is taking her prenatal vitamins.Reports no loss of fluid or vaginal bleeding. Patient doing well without any complaints. Pregnancy complicated by:     Patient Active Problem List   Diagnosis    Supervision of other normal pregnancy, antepartum    History of macrosomia in infant in prior pregnancy, currently pregnant in third trimester    Positive urine drug screen    Elevated glucose tolerance test    Anemia of mother in pregnancy, antepartum         OB History    Para Term  AB Living   4 2 2 0 1 2   SAB IAB Ectopic Molar Multiple Live Births   1 0 0 0 0 2      # Outcome Date GA Lbr Moise/2nd Weight Sex Delivery Anes PTL Lv   4 Current            3 Term 21 40w3d 28:40 / 00:09 4500 g (9 lb 14.7 oz) F Vag-Spont None N LYN   2 Term 20 40w0d  3629 g (8 lb) F Vag-Spont   LYN   1 2017 9w0d                  ROS:   General ROS: negative for - chills or fatigue  Respiratory ROS: negative for - cough or hemoptysis  Cardiovascular ROS: negative for - chest pain or dyspnea on exertion  Genito-Urinary ROS: negative for  change in urinary stream, vaginal discharge   Musculoskeletal ROS: negative for - gait disturbance or joint pain  Dermatological ROS: negative for acne,  dry skin or itching    Objective  Physical Exam:   Vitals:    24 1527   BP: 102/64       Uterine Size: not examined, US today  FHT: 110-160 BPM    General appearance - alert, well appearing, and in no distress  Mental status - alert, oriented to person, place, and time  Abdomen- Soft, Gravid uterus, non-tender to palpation  Musculoskeletal: negative for - gait disturbance or joint pain  Extremeties: negative swelling or cyanosis   Dermatological: negative rashes or skin lesions   GBS RV swab performed today    Assessment/Plan:   Diagnoses and all orders for this visit:    1. Supervision of other normal  pregnancy, antepartum (Primary)  -     POC Urinalysis Dipstick  -     CBC (No Diff)  -     Group B Strep (Molecular) - Swab, Vaginal/Rectum    2. History of macrosomia in infant in prior pregnancy, currently pregnant in third trimester    3. Anemia of mother in pregnancy, antepartum            Counseling:   OB precautions, leaking, VB, talha martines vs PTL/Labor  FKC  HTN precautions reviewed: HA, vision change, RUQ/epigastric pain, edema  Round Ligament Pain:  The uterus has several ligaments which provide support and keep the uterus in place. As the  uterus grows these ligaments are pulled and stretched which often causes sharp stabbing like pain in the inguinal area.   You may find a pregnancy support band helpful. Changing positions may also help. Yoga is a great way to cope with round ligament and low back pain in pregnancy.    Massage may also help with low back pain   Things to Consider at this Point in your Pregnancy:  Some women experience swelling in their feet during pregnancy. Compression stockings may help  Drink plenty of water and stay active   Make sure you are eating frequent small meals, nuts are a wonderful snack to keep with you            Return in about 1 week (around 1/23/2024) for Routine OB visit.      We have gone over prenatal care to include the timing and content of visits. I informed her how to contact the office and/or on call person in the event of any problems and encouraged her to do so when she feels it is necessary.  We then spent time answering her questions which she indicated were answered to her satisfaction.    Amparo Hogan,   1/16/2024 15:36 EST

## 2024-01-16 ENCOUNTER — ROUTINE PRENATAL (OUTPATIENT)
Dept: OBSTETRICS AND GYNECOLOGY | Facility: CLINIC | Age: 34
End: 2024-01-16
Payer: OTHER GOVERNMENT

## 2024-01-16 VITALS — SYSTOLIC BLOOD PRESSURE: 102 MMHG | DIASTOLIC BLOOD PRESSURE: 64 MMHG | WEIGHT: 178.8 LBS | BODY MASS INDEX: 28.86 KG/M2

## 2024-01-16 DIAGNOSIS — Z34.80 SUPERVISION OF OTHER NORMAL PREGNANCY, ANTEPARTUM: Primary | ICD-10-CM

## 2024-01-16 DIAGNOSIS — O09.293 HISTORY OF MACROSOMIA IN INFANT IN PRIOR PREGNANCY, CURRENTLY PREGNANT IN THIRD TRIMESTER: ICD-10-CM

## 2024-01-16 DIAGNOSIS — O99.019 ANEMIA OF MOTHER IN PREGNANCY, ANTEPARTUM: ICD-10-CM

## 2024-01-16 LAB
DEPRECATED RDW RBC AUTO: 45 FL (ref 37–54)
ERYTHROCYTE [DISTWIDTH] IN BLOOD BY AUTOMATED COUNT: 15.7 % (ref 12.3–15.4)
GLUCOSE UR STRIP-MCNC: NEGATIVE MG/DL
HCT VFR BLD AUTO: 34.1 % (ref 34–46.6)
HGB BLD-MCNC: 10.7 G/DL (ref 12–15.9)
MCH RBC QN AUTO: 25.3 PG (ref 26.6–33)
MCHC RBC AUTO-ENTMCNC: 31.4 G/DL (ref 31.5–35.7)
MCV RBC AUTO: 80.6 FL (ref 79–97)
PLATELET # BLD AUTO: 212 10*3/MM3 (ref 140–450)
PMV BLD AUTO: 12.4 FL (ref 6–12)
PROT UR STRIP-MCNC: NEGATIVE MG/DL
RBC # BLD AUTO: 4.23 10*6/MM3 (ref 3.77–5.28)
WBC NRBC COR # BLD AUTO: 13.42 10*3/MM3 (ref 3.4–10.8)

## 2024-01-16 PROCEDURE — 87653 STREP B DNA AMP PROBE: CPT | Performed by: OBSTETRICS & GYNECOLOGY

## 2024-01-16 PROCEDURE — 85027 COMPLETE CBC AUTOMATED: CPT | Performed by: OBSTETRICS & GYNECOLOGY

## 2024-01-16 NOTE — PATIENT INSTRUCTIONS
Venipuncture Blood Specimen Collection  Venipuncture performed in left arm by Yareli Sanderson with good hemostasis. Patient tolerated the procedure well without complications.   01/16/24   Yareli Sanderson

## 2024-01-17 ENCOUNTER — TELEPHONE (OUTPATIENT)
Dept: OBSTETRICS AND GYNECOLOGY | Facility: CLINIC | Age: 34
End: 2024-01-17
Payer: OTHER GOVERNMENT

## 2024-01-17 LAB — GROUP B STREP, DNA: NEGATIVE

## 2024-01-17 NOTE — TELEPHONE ENCOUNTER
----- Message from Amparo Hogan DO sent at 1/17/2024  8:11 AM EST -----  Hemoglobin has improved with iron, continue to take.     Electronically signed by:    Amparo Hogan DO  01/17/24  08:11 EST

## 2024-01-26 ENCOUNTER — ROUTINE PRENATAL (OUTPATIENT)
Dept: OBSTETRICS AND GYNECOLOGY | Facility: CLINIC | Age: 34
End: 2024-01-26
Payer: OTHER GOVERNMENT

## 2024-01-26 VITALS — WEIGHT: 184.2 LBS | BODY MASS INDEX: 29.73 KG/M2 | DIASTOLIC BLOOD PRESSURE: 65 MMHG | SYSTOLIC BLOOD PRESSURE: 107 MMHG

## 2024-01-26 DIAGNOSIS — O99.019 ANEMIA OF MOTHER IN PREGNANCY, ANTEPARTUM: ICD-10-CM

## 2024-01-26 DIAGNOSIS — O09.293 HISTORY OF MACROSOMIA IN INFANT IN PRIOR PREGNANCY, CURRENTLY PREGNANT IN THIRD TRIMESTER: ICD-10-CM

## 2024-01-26 DIAGNOSIS — Z34.80 SUPERVISION OF OTHER NORMAL PREGNANCY, ANTEPARTUM: Primary | ICD-10-CM

## 2024-01-26 LAB
GLUCOSE UR STRIP-MCNC: NEGATIVE MG/DL
PROT UR STRIP-MCNC: NEGATIVE MG/DL

## 2024-01-26 PROCEDURE — 0502F SUBSEQUENT PRENATAL CARE: CPT | Performed by: OBSTETRICS & GYNECOLOGY

## 2024-01-26 NOTE — PROGRESS NOTES
Routine Prenatal Visit     Subjective  Dominique Hicks is a 33 y.o.  at 36w5d here for her routine OB visit.   She is taking her prenatal vitamins.Reports no loss of fluid or vaginal bleeding. Patient doing well without any complaints. Pregnancy complicated by:     Patient Active Problem List   Diagnosis    Supervision of other normal pregnancy, antepartum    History of macrosomia in infant in prior pregnancy, currently pregnant in third trimester    Positive urine drug screen    Elevated glucose tolerance test    Anemia of mother in pregnancy, antepartum         OB History    Para Term  AB Living   4 2 2 0 1 2   SAB IAB Ectopic Molar Multiple Live Births   1 0 0 0 0 2      # Outcome Date GA Lbr Moise/2nd Weight Sex Delivery Anes PTL Lv   4 Current            3 Term 21 40w3d 28:40 / 00:09 4500 g (9 lb 14.7 oz) F Vag-Spont None N LYN   2 Term 20 40w0d  3629 g (8 lb) F Vag-Spont   LYN   1 2017 9w0d                  ROS:   General ROS: negative for - chills or fatigue  Respiratory ROS: negative for - cough or hemoptysis  Cardiovascular ROS: negative for - chest pain or dyspnea on exertion  Genito-Urinary ROS: negative for  change in urinary stream, vaginal discharge   Musculoskeletal ROS: negative for - gait disturbance or joint pain  Dermatological ROS: negative for acne,  dry skin or itching    Objective  Physical Exam:   Vitals:    24 1517   BP: 107/65       Uterine Size: size equals dates  FHT: 110-160 BPM    General appearance - alert, well appearing, and in no distress  Mental status - alert, oriented to person, place, and time  Abdomen- Soft, Gravid uterus, non-tender to palpation  Musculoskeletal: negative for - gait disturbance or joint pain  Extremeties: negative swelling or cyanosis   Dermatological: negative rashes or skin lesions       Assessment/Plan:   Diagnoses and all orders for this visit:    1. Supervision of other normal pregnancy, antepartum  (Primary)  Assessment & Plan:  PROBLEM LIST/PLAN:   Hx of macrosomia/GDM - 9#15oz, uncomplicated.    FU US growth  Anemia of preg-Iron Rx 11/20/2023, continue  Elev 1hr PG- 3hr w one abn.  Recommend decrease CHO in diet, and cont exercise, limit wt gain in preg  UDS+ Meth new OB visit - Possible false positive, repeat FU UDS x2 neg    Orders:  -     POC Urinalysis Dipstick    2. History of macrosomia in infant in prior pregnancy, currently pregnant in third trimester    3. Anemia of mother in pregnancy, antepartum            Counseling:   OB precautions, leaking, VB, talha martines vs PTL/Labor  FKC  HTN precautions reviewed: HA, vision change, RUQ/epigastric pain, edema  Round Ligament Pain:  The uterus has several ligaments which provide support and keep the uterus in place. As the  uterus grows these ligaments are pulled and stretched which often causes sharp stabbing like pain in the inguinal area.   You may find a pregnancy support band helpful. Changing positions may also help. Yoga is a great way to cope with round ligament and low back pain in pregnancy.    Massage may also help with low back pain   Things to Consider at this Point in your Pregnancy:  Some women experience swelling in their feet during pregnancy. Compression stockings may help  Drink plenty of water and stay active   Make sure you are eating frequent small meals, nuts are a wonderful snack to keep with you            Return in about 1 week (around 2/2/2024) for Routine OB visit.      We have gone over prenatal care to include the timing and content of visits. I informed her how to contact the office and/or on call person in the event of any problems and encouraged her to do so when she feels it is necessary.  We then spent time answering her questions which she indicated were answered to her satisfaction.    Amparo Hogan, DO  1/26/2024 16:34 EST

## 2024-01-31 NOTE — PROGRESS NOTES
OB FOLLOW UP      Chief Complaint   Patient presents with    Routine Prenatal Visit     Subjective:   No complaints    Objective:  /67   Wt 84.1 kg (185 lb 6.4 oz)   LMP 05/14/2023 Comment: 2 MONTHS AGO  BMI 29.92 kg/m²  19.3 kg (42 lb 9.6 oz) Facility age limit for growth %tim is 20 years.  See OB flow sheet for fundal height (not performed if US day of OV), FHT, edema, cvx exam if performed, and Upro/Uglu  Chaperone present during pelvic exam if performed.      Assessment and Plan:  37w4d     Diagnoses and all orders for this visit:    1. Supervision of other normal pregnancy, antepartum (Primary)  Overview:  JACOB finalized:2/18/24 based on LMP and confirmed with BSUS 13wks    Genetic testing (NIPS-Quad)/CF/AFP:  Declined     COVID: Recommended   Flu: Recommended   Tdap: Vaccinated  RSV: Recommended. S/p Rx    Sterilization:  Declined    Anatomy US:9/25/23: JACOB confirmed.  SIUP with cardiac activity @ 151, Breech, Anterior placenta, Grade 1, female.  Limited views of the face and profile   FU US: [facility] 11/20/23 2#5oz, 45%, AC 18%, MARIETTA 11, nl face and cardiac views, VTX   FU US: [facility] 1/16/24 5#10oz 39%, AC 50%, MARIETTA 14, VTX    EPDS:5    PROBLEM LIST/PLAN:   Hx of macrosomia/GDM - 9#15oz, uncomplicated.    FU US growth- done  Anemia of preg-Iron QOD,  continue  Elev 1hr PG- 3hr w one abn.  Recommend decrease CHO in diet, and cont exercise, limit wt gain in preg  UDS+ Meth new OB visit - Possible false positive, repeat FU UDS x2 neg      Orders:  -     POC Urinalysis Dipstick      Counseling:    OB precautions, leaking, VB, talha martines vs PTL/Labor  FKC  Declines IOL 13Feb at 39+2    Reassuring pregnancy progress. Questions answered  Continue PNV.  Importance of healthy eating, obtaining sufficient sleep, and staying active unless hypertensive- activity modified as directed.    Return in about 1 week (around 2/8/2024) for FU OB q1wk to JACOB/Delivery.            Ana Lennon,   02/01/2024    [facility]  [facility]  [address] [place] [address]  Dept: [phone]  Dept Fax: [phone]  Loc: [phone]  Loc Fax: [phone]

## 2024-02-01 ENCOUNTER — ROUTINE PRENATAL (OUTPATIENT)
Dept: OBSTETRICS AND GYNECOLOGY | Facility: CLINIC | Age: 34
End: 2024-02-01
Payer: OTHER GOVERNMENT

## 2024-02-01 VITALS — BODY MASS INDEX: 29.92 KG/M2 | DIASTOLIC BLOOD PRESSURE: 67 MMHG | WEIGHT: 185.4 LBS | SYSTOLIC BLOOD PRESSURE: 104 MMHG

## 2024-02-01 DIAGNOSIS — Z34.80 SUPERVISION OF OTHER NORMAL PREGNANCY, ANTEPARTUM: Primary | ICD-10-CM

## 2024-02-01 LAB
GLUCOSE UR STRIP-MCNC: NEGATIVE MG/DL
PROT UR STRIP-MCNC: NEGATIVE MG/DL

## 2024-02-06 NOTE — PROGRESS NOTES
OB FOLLOW UP      Chief Complaint   Patient presents with    Routine Prenatal Visit     Subjective:   Over last few days intermittent hard to catch her breath, can have wheezing at times too, no hx of asthma, no CP.  Can occas last for a couple hours.     Objective:  /68   Wt 85.3 kg (188 lb)   LMP 2023 Comment: 2 MONTHS AGO  BMI 30.34 kg/m²  20.5 kg (45 lb 3.2 oz) Facility age limit for growth %tim is 20 years.  See OB flow sheet for fundal height (not performed if US day of OV), FHT, edema, cvx exam if performed, and Upro/Uglu  Chaperone present during pelvic exam if performed.  CV- RRR no murmurs, HR 90's  Resp- CTA, no wheezes      Assessment and Plan:  38w5d     Diagnoses and all orders for this visit:    1. Supervision of other normal pregnancy, antepartum (Primary)  Overview:  JACOB finalized:24 based on LMP and confirmed with BSUS 13wks    Genetic testing (NIPS-Quad)/CF/AFP:  Declined     COVID: Recommended   Flu: Recommended   Tdap: Vaccinated    Sterilization:  Declined    Anatomy US:23: JACOB confirmed.  SIUP with cardiac activity @ 151, Breech, Anterior placenta, Grade 1, female.  Limited views of the face and profile   FU US: [facility] 23 2#5oz, 45%, AC 18%, MARIETTA 11, nl face and cardiac views, VTX   FU US: MG 24 5#10oz 39%, AC 50%, MARIETTA 14, VTX    EPDS:5    PROBLEM LIST/PLAN:   Hx of macrosomia/GDM - 9#15oz, uncomplicated.    FU US growth- done wnl  2024 suspected macrosomia by FH, reviewed option of IOL at 39+ [id number], fetus can gain 1/2-1lb per week and risk of delivery of larger infant.  Pt declines IOL next week.  Pt has had hx of rapid spont labor and uncomplicated  of 9#15oz.  Anemia of preg-Iron QOD,  continue  Elev 1hr PG- 3hr w one abn.  Recommend decrease CHO in diet, and cont exercise, limit wt gain in preg  UDS+ Meth new OB visit - Possible false positive, repeat FU UDS x2 neg      Orders:  -     POC Urinalysis Dipstick      Counseling:    OB precautions,  leaking, VB, talha martines vs PTL/Labor  FKC  We discussed with macrosomia especially and advanced gestational age, it is not uncommon to have difficulty taking a deep breath.  However wheezing is not normal or chest pain.  If she has wheezing that doesn't quickly spont resolve or chest pain, she needs to be seen in the emergency room immediately.    Reassuring pregnancy progress. Questions answered  Continue PNV.  Importance of healthy eating, obtaining sufficient sleep, and staying active unless hypertensive- activity modified as directed.    Return in about 1 week (around 2/16/2024) for FU OB.            Ana Lennon,   02/09/2024    [facility]  [facility] [place] [address]  Dept: [phone]  Dept Fax: [phone]  Loc: [phone]  Loc Fax: [phone]

## 2024-02-09 ENCOUNTER — ROUTINE PRENATAL (OUTPATIENT)
Dept: OBSTETRICS AND GYNECOLOGY | Facility: CLINIC | Age: 34
End: 2024-02-09
Payer: OTHER GOVERNMENT

## 2024-02-09 VITALS — SYSTOLIC BLOOD PRESSURE: 108 MMHG | WEIGHT: 188 LBS | DIASTOLIC BLOOD PRESSURE: 68 MMHG | BODY MASS INDEX: 30.34 KG/M2

## 2024-02-09 DIAGNOSIS — Z34.80 SUPERVISION OF OTHER NORMAL PREGNANCY, ANTEPARTUM: Primary | ICD-10-CM

## 2024-02-09 PROBLEM — O36.60X0 MACROSOMIA AFFECTING MANAGEMENT OF MOTHER: Status: ACTIVE | Noted: 2024-02-09

## 2024-02-09 LAB
GLUCOSE UR STRIP-MCNC: NEGATIVE MG/DL
PROT UR STRIP-MCNC: NEGATIVE MG/DL

## 2024-02-15 ENCOUNTER — ROUTINE PRENATAL (OUTPATIENT)
Dept: OBSTETRICS AND GYNECOLOGY | Facility: CLINIC | Age: 34
End: 2024-02-15
Payer: OTHER GOVERNMENT

## 2024-02-15 VITALS — SYSTOLIC BLOOD PRESSURE: 108 MMHG | WEIGHT: 188 LBS | DIASTOLIC BLOOD PRESSURE: 75 MMHG | BODY MASS INDEX: 30.34 KG/M2

## 2024-02-15 DIAGNOSIS — O36.63X0 MACROSOMIA OF FETUS AFFECTING MANAGEMENT OF MOTHER IN THIRD TRIMESTER, SINGLE OR UNSPECIFIED FETUS: ICD-10-CM

## 2024-02-15 DIAGNOSIS — Z34.80 SUPERVISION OF OTHER NORMAL PREGNANCY, ANTEPARTUM: Primary | ICD-10-CM

## 2024-02-15 LAB
GLUCOSE UR STRIP-MCNC: NEGATIVE MG/DL
PROT UR STRIP-MCNC: NEGATIVE MG/DL

## 2024-02-16 ENCOUNTER — HOSPITAL ENCOUNTER (INPATIENT)
Facility: HOSPITAL | Age: 34
LOS: 2 days | Discharge: HOME | End: 2024-02-18
Attending: OBSTETRICS & GYNECOLOGY | Admitting: OBSTETRICS & GYNECOLOGY
Payer: OTHER GOVERNMENT

## 2024-02-16 PROBLEM — Z37.9 NORMAL LABOR: Status: ACTIVE | Noted: 2024-02-16

## 2024-02-16 LAB
BILIRUB BLD-MCNC: NEGATIVE MG/DL
CLARITY, POC: CLEAR
COLOR UR: YELLOW
GLUCOSE UR STRIP-MCNC: NEGATIVE MG/DL
KETONES UR QL: NEGATIVE
LEUKOCYTE EST, POC: NEGATIVE
NITRITE UR-MCNC: NEGATIVE MG/ML
PH UR: 5.5 [PH] (ref 5–8)
PROT UR STRIP-MCNC: NEGATIVE MG/DL
RBC # UR STRIP: NEGATIVE /UL
SP GR UR: 1.02 (ref 1–1.03)
UROBILINOGEN UR QL: NORMAL

## 2024-02-16 PROCEDURE — 81002 URINALYSIS NONAUTO W/O SCOPE: CPT | Performed by: OBSTETRICS & GYNECOLOGY

## 2024-02-16 RX ORDER — OXYTOCIN/0.9 % SODIUM CHLORIDE 30/500 ML
2 PLASTIC BAG, INJECTION (ML) INTRAVENOUS CONTINUOUS PRN
Status: DISCONTINUED | OUTPATIENT
Start: 2024-02-16 | End: 2024-02-17

## 2024-02-16 RX ORDER — ACETAMINOPHEN 325 MG/1
650 TABLET ORAL EVERY 4 HOURS PRN
Status: DISCONTINUED | OUTPATIENT
Start: 2024-02-16 | End: 2024-02-17

## 2024-02-16 RX ORDER — OXYTOCIN/0.9 % SODIUM CHLORIDE 30/500 ML
999 PLASTIC BAG, INJECTION (ML) INTRAVENOUS ONCE
Status: CANCELLED | OUTPATIENT
Start: 2024-02-17 | End: 2024-02-17

## 2024-02-16 RX ORDER — FAMOTIDINE 10 MG/ML
20 INJECTION, SOLUTION INTRAVENOUS 2 TIMES DAILY PRN
Status: DISCONTINUED | OUTPATIENT
Start: 2024-02-16 | End: 2024-02-17

## 2024-02-16 RX ORDER — SODIUM CHLORIDE 9 MG/ML
40 INJECTION, SOLUTION INTRAVENOUS AS NEEDED
Status: DISCONTINUED | OUTPATIENT
Start: 2024-02-16 | End: 2024-02-17

## 2024-02-16 RX ORDER — SODIUM CHLORIDE 0.9 % (FLUSH) 0.9 %
10 SYRINGE (ML) INJECTION EVERY 12 HOURS SCHEDULED
Status: DISCONTINUED | OUTPATIENT
Start: 2024-02-17 | End: 2024-02-17

## 2024-02-16 RX ORDER — CITRIC ACID/SODIUM CITRATE 334-500MG
30 SOLUTION, ORAL ORAL ONCE AS NEEDED
Status: DISCONTINUED | OUTPATIENT
Start: 2024-02-16 | End: 2024-02-17

## 2024-02-16 RX ORDER — OXYTOCIN/0.9 % SODIUM CHLORIDE 30/500 ML
250 PLASTIC BAG, INJECTION (ML) INTRAVENOUS CONTINUOUS
Status: CANCELLED | OUTPATIENT
Start: 2024-02-17 | End: 2024-02-17

## 2024-02-16 RX ORDER — MISOPROSTOL 200 UG/1
800 TABLET ORAL AS NEEDED
Status: DISCONTINUED | OUTPATIENT
Start: 2024-02-16 | End: 2024-02-17

## 2024-02-16 RX ORDER — ONDANSETRON 4 MG/1
4 TABLET, ORALLY DISINTEGRATING ORAL EVERY 6 HOURS PRN
Status: DISCONTINUED | OUTPATIENT
Start: 2024-02-16 | End: 2024-02-17

## 2024-02-16 RX ORDER — METHYLERGONOVINE MALEATE 0.2 MG/ML
200 INJECTION INTRAVENOUS ONCE AS NEEDED
Status: DISCONTINUED | OUTPATIENT
Start: 2024-02-16 | End: 2024-02-17

## 2024-02-16 RX ORDER — SODIUM CHLORIDE, SODIUM LACTATE, POTASSIUM CHLORIDE, CALCIUM CHLORIDE 600; 310; 30; 20 MG/100ML; MG/100ML; MG/100ML; MG/100ML
125 INJECTION, SOLUTION INTRAVENOUS CONTINUOUS
Status: DISCONTINUED | OUTPATIENT
Start: 2024-02-17 | End: 2024-02-17

## 2024-02-16 RX ORDER — MORPHINE SULFATE 5 MG/ML
5 INJECTION, SOLUTION INTRAMUSCULAR; INTRAVENOUS
Status: DISCONTINUED | OUTPATIENT
Start: 2024-02-16 | End: 2024-02-17

## 2024-02-16 RX ORDER — PROMETHAZINE HYDROCHLORIDE 25 MG/1
25 TABLET ORAL EVERY 6 HOURS PRN
Status: DISCONTINUED | OUTPATIENT
Start: 2024-02-16 | End: 2024-02-17

## 2024-02-16 RX ORDER — TERBUTALINE SULFATE 1 MG/ML
0.25 INJECTION, SOLUTION SUBCUTANEOUS AS NEEDED
Status: DISCONTINUED | OUTPATIENT
Start: 2024-02-16 | End: 2024-02-17

## 2024-02-16 RX ORDER — FAMOTIDINE 20 MG/1
20 TABLET, FILM COATED ORAL 2 TIMES DAILY PRN
Status: DISCONTINUED | OUTPATIENT
Start: 2024-02-16 | End: 2024-02-17

## 2024-02-16 RX ORDER — CARBOPROST TROMETHAMINE 250 UG/ML
250 INJECTION, SOLUTION INTRAMUSCULAR ONCE AS NEEDED
Status: DISCONTINUED | OUTPATIENT
Start: 2024-02-16 | End: 2024-02-17

## 2024-02-16 RX ORDER — SODIUM CHLORIDE 0.9 % (FLUSH) 0.9 %
10 SYRINGE (ML) INJECTION AS NEEDED
Status: DISCONTINUED | OUTPATIENT
Start: 2024-02-16 | End: 2024-02-17

## 2024-02-16 RX ORDER — MAGNESIUM CARB/ALUMINUM HYDROX 105-160MG
30 TABLET,CHEWABLE ORAL ONCE
Status: DISCONTINUED | OUTPATIENT
Start: 2024-02-17 | End: 2024-02-17

## 2024-02-16 RX ORDER — ONDANSETRON 2 MG/ML
4 INJECTION INTRAMUSCULAR; INTRAVENOUS EVERY 6 HOURS PRN
Status: DISCONTINUED | OUTPATIENT
Start: 2024-02-16 | End: 2024-02-17

## 2024-02-17 LAB
ABO GROUP BLD: NORMAL
AMPHET+METHAMPHET UR QL: NEGATIVE
BARBITURATES UR QL SCN: NEGATIVE
BASE EXCESS BLDCOA CALC-SCNC: -5.8 MMOL/L (ref -2–2)
BASE EXCESS BLDCOV CALC-SCNC: -6.6 MMOL/L (ref -2–2)
BENZODIAZ UR QL SCN: NEGATIVE
BLD GP AB SCN SERPL QL: NEGATIVE
CANNABINOIDS SERPL QL: NEGATIVE
COCAINE UR QL: NEGATIVE
DEPRECATED RDW RBC AUTO: 60.4 FL (ref 37–54)
ERYTHROCYTE [DISTWIDTH] IN BLOOD BY AUTOMATED COUNT: 19.9 % (ref 12.3–15.4)
FENTANYL UR-MCNC: NEGATIVE NG/ML
HCO3 BLDCOA-SCNC: 24.5 MMOL/L
HCO3 BLDCOV-SCNC: 20.5 MMOL/L
HCT VFR BLD AUTO: 35.5 % (ref 34–46.6)
HGB BLD-MCNC: 11.4 G/DL (ref 12–15.9)
MCH RBC QN AUTO: 27.3 PG (ref 26.6–33)
MCHC RBC AUTO-ENTMCNC: 32.1 G/DL (ref 31.5–35.7)
MCV RBC AUTO: 84.9 FL (ref 79–97)
METHADONE UR QL SCN: NEGATIVE
OPIATES UR QL: NEGATIVE
OXYCODONE UR QL SCN: NEGATIVE
PCO2 BLDCOA: 71.1 MMHG (ref 33–49)
PCO2 BLDCOV: 46.5 MM HG (ref 28–40)
PH BLDCOA: 7.16 PH UNITS (ref 7.21–7.31)
PH BLDCOV: 7.26 PH UNITS (ref 7.31–7.37)
PLATELET # BLD AUTO: 174 10*3/MM3 (ref 140–450)
PMV BLD AUTO: 13.1 FL (ref 6–12)
PO2 BLDCOA: <40.5 MMHG
PO2 BLDCOV: <40.5 MM HG (ref 21–31)
RBC # BLD AUTO: 4.18 10*6/MM3 (ref 3.77–5.28)
RH BLD: POSITIVE
T PALLIDUM IGG SER QL: NORMAL
T&S EXPIRATION DATE: NORMAL
WBC NRBC COR # BLD AUTO: 14.1 10*3/MM3 (ref 3.4–10.8)

## 2024-02-17 PROCEDURE — 80307 DRUG TEST PRSMV CHEM ANLYZR: CPT | Performed by: OBSTETRICS & GYNECOLOGY

## 2024-02-17 PROCEDURE — 85027 COMPLETE CBC AUTOMATED: CPT | Performed by: OBSTETRICS & GYNECOLOGY

## 2024-02-17 PROCEDURE — 86900 BLOOD TYPING SEROLOGIC ABO: CPT | Performed by: OBSTETRICS & GYNECOLOGY

## 2024-02-17 PROCEDURE — 25810000003 LACTATED RINGERS PER 1000 ML: Performed by: OBSTETRICS & GYNECOLOGY

## 2024-02-17 PROCEDURE — 25010000002 OXYTOCIN PER 10 UNITS

## 2024-02-17 PROCEDURE — 82803 BLOOD GASES ANY COMBINATION: CPT | Performed by: OBSTETRICS & GYNECOLOGY

## 2024-02-17 PROCEDURE — 86850 RBC ANTIBODY SCREEN: CPT | Performed by: OBSTETRICS & GYNECOLOGY

## 2024-02-17 PROCEDURE — G0463 HOSPITAL OUTPT CLINIC VISIT: HCPCS

## 2024-02-17 PROCEDURE — 86901 BLOOD TYPING SEROLOGIC RH(D): CPT | Performed by: OBSTETRICS & GYNECOLOGY

## 2024-02-17 PROCEDURE — 10907ZC DRAINAGE OF AMNIOTIC FLUID, THERAPEUTIC FROM PRODUCTS OF CONCEPTION, VIA NATURAL OR ARTIFICIAL OPENING: ICD-10-PCS | Performed by: OBSTETRICS & GYNECOLOGY

## 2024-02-17 PROCEDURE — 86780 TREPONEMA PALLIDUM: CPT | Performed by: OBSTETRICS & GYNECOLOGY

## 2024-02-17 RX ORDER — ACETAMINOPHEN 325 MG/1
650 TABLET ORAL ONCE AS NEEDED
Status: DISCONTINUED | OUTPATIENT
Start: 2024-02-17 | End: 2024-02-17

## 2024-02-17 RX ORDER — FERROUS SULFATE 325(65) MG
325 TABLET ORAL 2 TIMES DAILY WITH MEALS
Status: DISCONTINUED | OUTPATIENT
Start: 2024-02-17 | End: 2024-02-18 | Stop reason: HOSPADM

## 2024-02-17 RX ORDER — ONDANSETRON 4 MG/1
4 TABLET, ORALLY DISINTEGRATING ORAL EVERY 6 HOURS PRN
Status: DISCONTINUED | OUTPATIENT
Start: 2024-02-17 | End: 2024-02-17

## 2024-02-17 RX ORDER — HYDROCODONE BITARTRATE AND ACETAMINOPHEN 5; 325 MG/1; MG/1
1 TABLET ORAL EVERY 4 HOURS PRN
Status: DISCONTINUED | OUTPATIENT
Start: 2024-02-17 | End: 2024-02-18 | Stop reason: HOSPADM

## 2024-02-17 RX ORDER — HYDROCODONE BITARTRATE AND ACETAMINOPHEN 5; 325 MG/1; MG/1
1 TABLET ORAL EVERY 4 HOURS PRN
Status: DISCONTINUED | OUTPATIENT
Start: 2024-02-17 | End: 2024-02-17 | Stop reason: HOSPADM

## 2024-02-17 RX ORDER — ONDANSETRON 4 MG/1
4 TABLET, ORALLY DISINTEGRATING ORAL EVERY 8 HOURS PRN
Status: DISCONTINUED | OUTPATIENT
Start: 2024-02-17 | End: 2024-02-18 | Stop reason: HOSPADM

## 2024-02-17 RX ORDER — OXYTOCIN 10 [USP'U]/ML
INJECTION, SOLUTION INTRAMUSCULAR; INTRAVENOUS
Status: COMPLETED
Start: 2024-02-17 | End: 2024-02-17

## 2024-02-17 RX ORDER — DOCUSATE SODIUM 100 MG/1
100 CAPSULE, LIQUID FILLED ORAL DAILY
Status: DISCONTINUED | OUTPATIENT
Start: 2024-02-17 | End: 2024-02-18 | Stop reason: HOSPADM

## 2024-02-17 RX ORDER — LIDOCAINE HYDROCHLORIDE 10 MG/ML
INJECTION, SOLUTION EPIDURAL; INFILTRATION; INTRACAUDAL; PERINEURAL
Status: DISCONTINUED
Start: 2024-02-17 | End: 2024-02-17 | Stop reason: WASHOUT

## 2024-02-17 RX ORDER — ACETAMINOPHEN 325 MG/1
650 TABLET ORAL EVERY 6 HOURS PRN
Status: DISCONTINUED | OUTPATIENT
Start: 2024-02-17 | End: 2024-02-18 | Stop reason: HOSPADM

## 2024-02-17 RX ORDER — HYDROCODONE BITARTRATE AND ACETAMINOPHEN 10; 325 MG/1; MG/1
1 TABLET ORAL EVERY 4 HOURS PRN
Status: DISCONTINUED | OUTPATIENT
Start: 2024-02-17 | End: 2024-02-17 | Stop reason: HOSPADM

## 2024-02-17 RX ORDER — PROMETHAZINE HYDROCHLORIDE 25 MG/1
25 TABLET ORAL EVERY 6 HOURS PRN
Status: DISCONTINUED | OUTPATIENT
Start: 2024-02-17 | End: 2024-02-17

## 2024-02-17 RX ORDER — FAMOTIDINE 20 MG/1
20 TABLET, FILM COATED ORAL ONCE AS NEEDED
Status: DISCONTINUED | OUTPATIENT
Start: 2024-02-17 | End: 2024-02-17

## 2024-02-17 RX ORDER — CALCIUM CARBONATE 500 MG/1
1 TABLET, CHEWABLE ORAL 3 TIMES DAILY PRN
Status: DISCONTINUED | OUTPATIENT
Start: 2024-02-17 | End: 2024-02-18 | Stop reason: HOSPADM

## 2024-02-17 RX ORDER — MISOPROSTOL 200 UG/1
600 TABLET ORAL ONCE AS NEEDED
Status: DISCONTINUED | OUTPATIENT
Start: 2024-02-17 | End: 2024-02-18 | Stop reason: HOSPADM

## 2024-02-17 RX ORDER — IBUPROFEN 800 MG/1
800 TABLET ORAL ONCE AS NEEDED
Status: DISCONTINUED | OUTPATIENT
Start: 2024-02-17 | End: 2024-02-17

## 2024-02-17 RX ORDER — HYDROCODONE BITARTRATE AND ACETAMINOPHEN 10; 325 MG/1; MG/1
1 TABLET ORAL EVERY 4 HOURS PRN
Status: DISCONTINUED | OUTPATIENT
Start: 2024-02-17 | End: 2024-02-18 | Stop reason: HOSPADM

## 2024-02-17 RX ORDER — ONDANSETRON 2 MG/ML
4 INJECTION INTRAMUSCULAR; INTRAVENOUS EVERY 6 HOURS PRN
Status: DISCONTINUED | OUTPATIENT
Start: 2024-02-17 | End: 2024-02-17

## 2024-02-17 RX ORDER — FAMOTIDINE 10 MG/ML
20 INJECTION, SOLUTION INTRAVENOUS ONCE AS NEEDED
Status: DISCONTINUED | OUTPATIENT
Start: 2024-02-17 | End: 2024-02-17

## 2024-02-17 RX ORDER — IBUPROFEN 600 MG/1
600 TABLET ORAL EVERY 6 HOURS PRN
Status: DISCONTINUED | OUTPATIENT
Start: 2024-02-17 | End: 2024-02-18 | Stop reason: HOSPADM

## 2024-02-17 RX ADMIN — OXYTOCIN 10 UNITS: 10 INJECTION INTRAVENOUS at 08:04

## 2024-02-17 RX ADMIN — IBUPROFEN 600 MG: 600 TABLET, FILM COATED ORAL at 12:20

## 2024-02-17 RX ADMIN — Medication 2 MILLI-UNITS/MIN: at 03:15

## 2024-02-17 RX ADMIN — FERROUS SULFATE TAB 325 MG (65 MG ELEMENTAL FE) 325 MG: 325 (65 FE) TAB at 17:32

## 2024-02-17 RX ADMIN — SODIUM CHLORIDE, POTASSIUM CHLORIDE, SODIUM LACTATE AND CALCIUM CHLORIDE 125 ML/HR: 600; 310; 30; 20 INJECTION, SOLUTION INTRAVENOUS at 00:16

## 2024-02-17 NOTE — NURSING NOTE
at bedside assessing pt., monitor strip viewed, order received to allow pt. To amb. In halls and recheck cervix in two hours.

## 2024-02-17 NOTE — L&D DELIVERY NOTE
[facility]   Vaginal Delivery Note    Patient Name: Dominique Hicks  : 1990  MRN: [id number]    Date of Delivery: 2024     Diagnosis     Pre & Post-Delivery:  Intrauterine pregnancy at 39w6d  Labor status: Spontaneous Onset of Labor     Normal labor             Problem List    Transfer to Postpartum     Review the Delivery Report for details.     Delivery     Delivery: Vaginal, Spontaneous     YOB: 2023    Time of Birth:  Gestational Age 7:48 AM   39w6d     Anesthesia: None     Delivering clinician: Marilou Jessica    Forceps?   No   Vacuum? No    Shoulder dystocia present: No        Delivery narrative: Presented in labor at 39 weeks and 5 days.  She was admitted she received low-dose Pitocin.  She had artificial rupture membranes and internal monitors.  She progressed to stage II labor.  She had a spontaneous vaginal delivery of a liveborn female infant over an intact perineum.  She was bulb suction and then attended to by the nursery personnel.  After approximately 1 minute the umbilical cord was clamped in 2 places.  It was then cut by the father.  Samples of blood were collected from the umbilical cord for Prasanna testing and arterial and venous blood gas analysis.  There was spontaneous delivery intact placenta with three-vessel cord and trailing membranes.  She had some blood massage and uterine exploratiion with sponge on a stick.  The birth canal was intact.  Mom and infant are engaged in Kangaroo care in satisfactory condition.  .  Placenta will be discarded.      Infant     Findings: female  infant     Infant observations: Weight: 3740 g (8 lb 3.9 oz)   Length: 20  in  Observations/Comments:        Apgars: 9  @ 1 minute /    9  @ 5 minutes   Infant Name: Lyanna     Placenta & Cord         Placenta delivered  Spontaneous  at   2024  7:50 AM     Cord: 3 vessels  present.   Nuchal Cord?  no   Cord blood obtained: Yes    Cord gases obtained:  Yes    Cord  gas results: Venous:    pH, Cord Venous   Date Value Ref Range Status   02/17/2024 7.262 (L) 7.310 - 7.370 pH Units Final     Base Excess, Cord Venous   Date Value Ref Range Status   02/17/2024 -6.6 (L) -2.0 - 2.0 mmol/L Final       Arterial:    pH, Cord Arterial   Date Value Ref Range Status   02/17/2024 7.16 (L) 7.21 - 7.31 pH Units Final     Base Exc, Cord Arterial   Date Value Ref Range Status   02/17/2024 -5.8 (L) -2.0 - 2.0 mmol/L Final        Repair     Episiotomy: None     No    Lacerations: No   Estimated Blood Loss:       Quantitative Blood Loss:          Complications     none    Disposition     Mother to Remain in LD  in stable condition currently.  Baby to remains with mom  in stable condition currently.    Marilou Jessica MD  02/17/24  08:57 EST

## 2024-02-17 NOTE — NURSING NOTE
" 39.5 weeks presents with c/o ctx: since last night after  stripped her membranes in the office yesterday, states \"I was dilated 3cm yesterday\" pt. States ctx: have become more regular and intense since  tonight, pt. Denies leakage of fluid or vaginal bleeding,  states she has been losing her mucus plug since the SVE yesterday,states +fm noted, pt. Denies any problems in pregnancy and states her second child weighed 10 pounds and delivered vaginally without any difficulty, regular ctx: noted on monitor, palpate moderate, SVE 3-4/70/-2 no blood or fluid noted on exam glove, [id number],  notified of the above, and dipped urine results, states she will come and assess, plan to observe for two hours and recheck cervix for change.  " 09-Jan-2022 00:28

## 2024-02-17 NOTE — H&P
J[facility]  Obstetric History and Physical    Chief Complaint   Patient presents with    Contractions       Subjective     HPI:    Patient is a 33 y.o. female  currently at 39w5d, who presents to OB ED with/for contractions worsening over the last several hours.    She has been seeing Dr. Lennon for her prenatal care.  The pregnancy has been complicated by the following problems:    Patient Active Problem List   Diagnosis    Supervision of other normal pregnancy, antepartum    History of macrosomia in infant in prior pregnancy, currently pregnant in third trimester    Positive urine drug screen    Elevated glucose tolerance test    Anemia of mother in pregnancy, antepartum    Suspected macrosomia     She was seen yesterday and patient states she was told she was 3cm (although prenatal record says ). Dr. Lennon stripped her membranes.    The following portions of the patients history were reviewed and updated as appropriate:   current medications, allergies, past medical history, past surgical history, past family history, past social history and current problem list.     Prenatal Information:  Prenatal Results       Initial Prenatal Labs       Test Value Reference Range Date Time    Hemoglobin  12.4 g/dL 12.0 - 15.9 23 1056    Hematocrit  36.6 % 34.0 - 46.6 23 1056    Platelets  259 10*3/mm3 140 - 450 23 1056    Rubella IgG  1.24 index Immune >0.99 23 1056    Hepatitis B SAg  Non-Reactive  Non-Reactive 23 1056    Hepatitis C Ab  Non-Reactive  Non-Reactive 23 1056    RPR ^ Negative   20     T. Pallidum Ab   Non-Reactive  Non-Reactive 23 1056    ABO  A   23 1056    Rh  Positive   23 1056    Antibody Screen  Negative   23 1056    HIV  Non-Reactive  Non-Reactive 23 1056    Urine Culture  No growth   23 0948    Gonorrhea  Not Detected  Not Detected  23 0948    Chlamydia  Not Detected  Not Detected  23 0948    TSH  1.200  uIU/mL 0.270 - 4.200 03/23/23 1410    HgB A1c   5.20 % 4.80 - 5.60 08/24/23 1056                2nd and 3rd Trimester       Test Value Reference Range Date Time    Hemoglobin (repeated)  10.7 g/dL 12.0 - 15.9 01/16/24 1550       10.5 g/dL 12.0 - 15.9 11/20/23 0953    Hematocrit (repeated)  34.1 % 34.0 - 46.6 01/16/24 1550       30.5 % 34.0 - 46.6 11/20/23 0953    Platelets   212 10*3/mm3 140 - 450 01/16/24 1550       230 10*3/mm3 140 - 450 11/20/23 0953       259 10*3/mm3 140 - 450 08/24/23 1056    GCT  144 mg/dL 65 - 139 11/20/23 0953       130 mg/dL 65 - 139 08/24/23 1056    Group B Strep  Negative  Negative 01/16/24 1532                Drug Screening       Test Value Reference Range Date Time    Amphetamine Screen        Barbiturate Screen  Negative  Negative 10/23/23 0901       Negative  Negative 08/29/23 0921       Negative  Negative 08/24/23 0948    Benzodiazepine Screen  Negative  Negative 10/23/23 0901       Negative  Negative 08/29/23 0921       Negative  Negative 08/24/23 0948    Methadone Screen  Negative  Negative 10/23/23 0901       Negative  Negative 08/29/23 0921       Negative  Negative 08/24/23 0948    Phencyclidine Screen        Opiates Screen  Negative  Negative 10/23/23 0901       Negative  Negative 08/29/23 0921       Negative  Negative 08/24/23 0948    THC Screen  Negative  Negative 10/23/23 0901       Negative  Negative 08/29/23 0921       Negative  Negative 08/24/23 0948    Cocaine Screen  Negative  Negative 10/23/23 0901       Negative  Negative 08/29/23 0921       Negative  Negative 08/24/23 0948    Propoxyphene Screen        Buprenorphine Screen        Methamphetamine Screen        Oxycodone Screen  Negative  Negative 10/23/23 0901       Negative  Negative 08/29/23 0921       Negative  Negative 08/24/23 0948    Tricyclic Antidepressants Screen                   External Prenatal Results       Pregnancy Outside Results - Transcribed From Office Records - See Scanned Records For Details        Test Value Date Time    ABO  A  23 1056    Rh  Positive  23 1056    Antibody Screen  Negative  23 1056    Varicella IgG       Rubella  1.24 index 23 1056    Hgb  10.7 g/dL 24 1550       10.5 g/dL 23 0953       12.4 g/dL 23 1056    Hct  34.1 % 24 1550       30.5 % 23 0953       36.6 % 23 1056    Glucose Fasting GTT       Glucose Tolerance Test 1 hour ^ 136  21     Glucose Tolerance Test 3 hour       Gonorrhea (discrete)  Not Detected  23 0948    Chlamydia (discrete)  Not Detected  23 0948    RPR ^ Negative  20     VDRL       Syphilis Antibody       HBsAg  Non-Reactive  23 1056    Herpes Simplex Virus PCR       Herpes Simplex VIrus Culture       HIV  Non-Reactive  23 1056    Hep C RNA Quant PCR ^ 0  20     Hep C Antibody  Non-Reactive  23 1056    AFP       Group B Strep  Negative  24 1532                  Past OB History:     OB History    Para Term  AB Living   4 2 2 0 1 2   SAB IAB Ectopic Molar Multiple Live Births   1 0 0 0 0 2      # Outcome Date GA Lbr Moise/2nd Weight Sex Delivery Anes PTL Lv   4 Current            3 Term 21 40w3d 28:40 / 00:09 4500 g (9 lb 14.7 oz) F Vag-Spont None N LYN      Name: GREG BENSONJUVENTINO      Apgar1: 8  Apgar5: 9   2 Term 20 40w0d  3629 g (8 lb) F Vag-Spont   LYN   1 SAB  9w0d              Past Medical History: Past Medical History:   Diagnosis Date    Anemia     Mastitis     Ovarian cyst       Past Surgical History Past Surgical History:   Procedure Laterality Date    BREAST ABSCESS INCISION AND DRAINAGE        Family History: Family History   Problem Relation Age of Onset    Ovarian cancer Mother         patient states not cancerous    Heart disease Mother     Thyroid disease Maternal Grandmother     Breast cancer Paternal Aunt 30    Uterine cancer Neg Hx     Colon cancer Neg Hx     Prostate cancer Neg Hx     Pulmonary  embolism Neg Hx     Deep vein thrombosis Neg Hx       Social History:  reports that she has never smoked. She has never used smokeless tobacco.   reports that she does not currently use alcohol.   reports no history of drug use.        General ROS: Pertinent items are noted in HPI  Home Medications:  ferrous sulfate and prenatal vitamin 27-0.8    Allergies:  No Known Allergies    Objective       Vital Signs Range for the last 24 hours  Temperature: Temp:  [97.7 °F (36.5 °C)] 97.7 °F (36.5 °C)   Temp Source: Temp src: Oral   BP: BP: (114)/(72) 114/72   Pulse: Heart Rate:  [95] 95   Respirations: Resp:  [20] 20   SPO2: SpO2:  [100 %] 100 %     Physical Examination:   General appearance - alert, well appearing, and in no distress  Mental status - alert, oriented to person, place, and time  Chest - normal effort; CTA  Heart - normal rate, regular rhythm  Abdomen - soft, nondistended; no rebound or guarding  Pelvic - 3-4/70/-2  Back exam - no CVA tenderness  Neurological - alert, oriented, normal speech  Extremities - Edema Trace; DTR 1+  Skin - normal coloration and turgor, no suspicious skin lesions noted    Presentation: cephalic   Cervix: Method: sterile exam per RN   Dilation: Cervical Dilation (cm): 3-4   Effacement: Cervical Effacement: 70%   Station:         Fetal Heart Rate Assessment :      Beats/min: Fetal HR (beats/min): 135   Baseline: Fetal HR Baseline: normal range   Variability: Fetal HR Variability: moderate (amplitude range 6 to 25 bpm)   Accels: Fetal HR Accelerations: episodic, greater than/equal to 15 bpm, lasting at least 15 seconds   Decels: Fetal HR Decelerations: absent   Tracing Category:       Uterine Assessment   Method: Method: palpation, external tocotransducer   Frequency (min): Contraction Frequency (Minutes): 2-5   Ctx Count in 10 min:     Duration:     Intensity: Contraction Intensity: moderate by palpation   Elliston Units:       GBS is negative      Assessment & Plan      Assessment:  -  Intrauterine pregnancy at 39w5d gestation who presents for: contractions  -  GBS status:   Group B Strep, DNA   Date Value Ref Range Status   01/16/2024 Negative Negative Final       Plan:  Contractions  - will watch for 2 hours and check for cervical change = change to 4 cm/70%  - admit for labor    - Plan of care has been reviewed with patient and patient agrees.   - Risks, benefits of treatment plan have been discussed.  - All questions have been answered.        Electronically signed by Alanis Snyder MD, 02/16/24, 10:25 PM EST.

## 2024-02-17 NOTE — NURSING NOTE
Pt. Back from amb. In halls, states ctx: feel stronger and more frequent since amb. Sve 4/70/-2,  at nurses station viewing strip informed of sve, orders received to admit for labor.

## 2024-02-17 NOTE — LACTATION NOTE
Initial visit with pt, this is baby #3 to breastfeed, pt states baby is latching well, denies pain with feeding, discussed attempting to feed baby every 3 allowing unlimited access to breast with unlimited time on breast. Encouraged her to do awake skin to skin as much as possible. LC discussed normal  feeding behavior during the first few days of breastfeeding. I went over waking techniques and how to keep baby awake at breast. Encouraged pt to call out as needed for LC/staff assistance.

## 2024-02-17 NOTE — PROGRESS NOTES
" Rob  Obstetric Progress Note    Subjective     Patient:    The patient feels well.      Objective     Vital Signs Range for the last 24 hours  Temp:  [97.7 °F (36.5 °C)] 97.7 °F (36.5 °C)   Temp src: Oral   BP: (110-114)/(72-73) 110/73   Heart Rate:  [] 118   Resp:  [20] 20   SpO2:  [100 %] 100 %           Weight:  [85.3 kg (188 lb)] 85.3 kg (188 lb)       Flowsheet Rows      Flowsheet Row First Filed Value   Admission Height 167.6 cm (66\") Documented at 02/16/2024 2143   Admission Weight 85.3 kg (188 lb) Documented at 02/16/2024 2143            Intake/Output last 24 hours:    No intake or output data in the 24 hours ending 02/17/24 0407    Intake/Output this shift:    No intake/output data recorded.    Physical Exam:  General: Patient is in no acute distress   Heart CVS exam: normal rate, regular rhythm, normal S1, S2, no murmurs, rubs, clicks or gallops.   Lungs Chest: clear to auscultation, no wheezes, rales or rhonchi, symmetric air entry.     Abdomen Abdominal exam: soft, nontender, nondistended, no masses or organomegaly.   Extremities Exam of extremities: peripheral pulses normal, no pedal edema, no clubbing or cyanosis     Presentation: Vertex   Cervix: Exam by: Method: sterile exam per physician   Dilation: Cervical Dilation (cm): 4-5   Effacement: Cervical Effacement: 60%   Station:           Fetal Heart Rate Assessment   Method: Fetal HR Assessment Method: external   Beats/min: Fetal HR (beats/min): 125   Baseline: Fetal HR Baseline: normal range   Variability: Fetal HR Variability: moderate (amplitude range 6 to 25 bpm)   Accels: Fetal HR Accelerations: lasting at least 15 seconds, greater than/equal to 15 bpm   Decels: Fetal HR Decelerations: absent   Tracing Category:       Uterine Assessment   Method: Method: palpation, external tocotransducer   Frequency (min): Contraction Frequency (Minutes): 3-5   Ctx Count in 10 min:     Duration:     Intensity: Contraction Intensity: moderate by " palpation   Intensity by IUPC:     Resting Tone: Uterine Resting Tone: soft by palpation   Resting Tone by IUPC:     Romi Units:         Assessment & Plan       Normal labor        Assessment:  1.  Intrauterine pregnancy at 39w6d gestation with reactive fetal status.    2.  labor  AROM clear  3.  Obstetrical history significant for is remarkable for .  4.  GBS status:   Group B Strep, DNA   Date Value Ref Range Status   01/16/2024 Negative Negative Final       Plan:  1. Vaginal anticipated  2. Plan of care has been reviewed with patient and support persons at bedside  3.  Risks, benefits of treatment plan have been discussed.  4.  All questions have been answered.  5.  Continue Pitocin as indicated      Marilou Jessica MD  2/17/2024  04:07 EST

## 2024-02-18 VITALS
WEIGHT: 188 LBS | SYSTOLIC BLOOD PRESSURE: 105 MMHG | HEIGHT: 66 IN | TEMPERATURE: 98.2 F | BODY MASS INDEX: 30.22 KG/M2 | HEART RATE: 99 BPM | OXYGEN SATURATION: 100 % | DIASTOLIC BLOOD PRESSURE: 72 MMHG | RESPIRATION RATE: 17 BRPM

## 2024-02-18 RX ORDER — ACETAMINOPHEN 325 MG/1
650 TABLET ORAL EVERY 6 HOURS PRN
Qty: 20 TABLET | Refills: 0 | Status: SHIPPED | OUTPATIENT
Start: 2024-02-18

## 2024-02-18 RX ORDER — IBUPROFEN 600 MG/1
600 TABLET ORAL EVERY 6 HOURS PRN
Qty: 30 TABLET | Refills: 0 | Status: SHIPPED | OUTPATIENT
Start: 2024-02-18

## 2024-02-18 RX ADMIN — IBUPROFEN 600 MG: 600 TABLET, FILM COATED ORAL at 05:57

## 2024-02-18 RX ADMIN — FERROUS SULFATE TAB 325 MG (65 MG ELEMENTAL FE) 325 MG: 325 (65 FE) TAB at 08:38

## 2024-02-18 RX ADMIN — DOCUSATE SODIUM 100 MG: 100 CAPSULE, LIQUID FILLED ORAL at 08:38

## 2024-02-18 NOTE — DISCHARGE SUMMARY
" Rivas  Delivery Discharge Summary    Primary OB Clinician:     EDC: Estimated Date of Delivery: 24    Admitting Diagnosis:  Normal labor [O80, Z37.9]    Discharge Diagnosis:  Same as Admitting plus:   Pregnancy at 39w6d - Delivered     Antepartum complications: none    Date of Delivery: 2024   Time of Delivery: 7:48 AM     Delivered By:  Marilou Jessica     Delivery Type: Vaginal, Spontaneous      Tubal Ligation: n/a    Baby:female  infant;   Apgar:  9  @ 1 minute /   Apgar:  9  @ 5 minutes   Weight: 3740 g (8 lb 3.9 oz)    Length: 20     Anesthesia: None      Intrapartum complications: None    Laceration: No    Episiotomy: No    Placenta: Spontaneous     Feeding method: Breastfeeding Status: Yes    Rh Immune globulin given: not applicable    Rubella vaccine given: not applicable  ginal Delivery Progress Note    Subjective   Postpartum Day 1: Vaginal Delivery    The patient feels well.  Her pain is well controlled with nonsteroidal anti-inflammatory drugs and Tylenol.   She is ambulating well.  Patient describes her bleeding as moderate lochia.    Breastfeeding: infant latching.    Objective     Vital Signs Range for the last 24 hours  Temperature: Temp:  [98.2 °F (36.8 °C)-98.5 °F (36.9 °C)] 98.2 °F (36.8 °C)   Temp Source: Temp src: Oral   BP: BP: ()/(63-74) 105/72   Pulse: Heart Rate:  [71-99] 99   Respirations: Resp:  [16-18] 17   SPO2:     O2 Amount (l/min):     O2 Devices     Weight:       Admit Height:  Height: 167.6 cm (66\")      Physical Exam:  General:  no acute distress.  Abdomen: Fundus: appropriate, firm, non tender  Extremities: normal, atraumatic, no cyanosis, and trace edema.     Hospital course:  Admitted in labor.  Had an uncomplicated vaginal delivery.  Please see delivery summary for details.  She has had a satisfactory postpartum course and would like to be discharged today.  She is tolerating p.o. foods and fluids, voiding and reporting adequate pain management.  She has " decided to breast-feed.  She will maintain pelvic rest call if she has any problems with heavy bleeding infection or depression.  Avoid water submersion.  Daily showers are permissible.  Follow-up with her provider in 5 to 6 weeks.    Discharge Date: 2/18/2024; Discharge Time: 12:10 EST        Plan:      Follow-up appointment with Dr. Lennon in 5-6 weeks.     Electronically signed by Marilou Jessica MD, 02/18/24, 12:10 PM EST.

## 2024-02-18 NOTE — LACTATION NOTE
Pt states baby is feeding well, denies pain with latching. D/C instructions gone over, included hand hygiene, respiratory hygiene and breastfeeding when mom is sick, LC encouraged pt to see pediatrician within two days of discharge for follow up. LC discussed  breastfeeding behaviors, first two weeks of breastfeeding expectations, encouraged her to breastfeed/pump frequently for good milk supply. LC discussed nipple care, plugged ducts, engorgement, and breast infection. LC informed pt that LC was available after D/C for assistance with breastfeeding.

## 2024-02-18 NOTE — PLAN OF CARE
Problem: Adult Inpatient Plan of Care  Goal: Plan of Care Review  Outcome: Met  Goal: Patient-Specific Goal (Individualized)  Outcome: Met  Goal: Absence of Hospital-Acquired Illness or Injury  Outcome: Met  Goal: Optimal Comfort and Wellbeing  Outcome: Met  Goal: Readiness for Transition of Care  Outcome: Met  Intervention: Mutually Develop Transition Plan  Description: Identify available resources for support (e.g., family, friends, community).  Identify and address barriers to ongoing treatment and home management (e.g., environmental, financial).  Provide opportunities to practice self-management skills.  Assess and monitor emotional readiness for transition.  Establish or reconnect linkage with outpatient providers or community-based services.  Recent Flowsheet Documentation  Taken 2/18/2024 1218 by Merry López RN  Equipment Needed After Discharge: none  Equipment Currently Used at Home: none  Anticipated Changes Related to Illness: none  Transportation Anticipated: family or friend will provide  Concerns to be Addressed: no discharge needs identified  Readmission Within the Last 30 Days: no previous admission in last 30 days  Patient/Family Anticipated Services at Transition: none  Patient/Family Anticipates Transition to: home with family     Problem: Adjustment to Role Transition (Postpartum Vaginal Delivery)  Goal: Successful Maternal Role Transition  Outcome: Met     Problem: Bleeding (Postpartum Vaginal Delivery)  Goal: Hemostasis  Outcome: Met     Problem: Infection (Postpartum Vaginal Delivery)  Goal: Absence of Infection Signs/Symptoms  Outcome: Met     Problem: Pain (Postpartum Vaginal Delivery)  Goal: Acceptable Pain Control  Outcome: Met     Problem: Urinary Retention (Postpartum Vaginal Delivery)  Goal: Effective Urinary Elimination  Outcome: Met   Goal Outcome Evaluation:

## 2024-02-22 ENCOUNTER — MATERNAL SCREENING (OUTPATIENT)
Dept: CALL CENTER | Facility: HOSPITAL | Age: 34
End: 2024-02-22
Payer: OTHER GOVERNMENT

## 2024-02-22 NOTE — OUTREACH NOTE
Maternal Screening Survey      Flowsheet Row Responses   Facility patient discharged from? [facility]   Attempt successful? No   Unsuccessful attempts Attempt 2              Jamin GARCIAS - Registered Nurse

## 2024-02-22 NOTE — OUTREACH NOTE
Maternal Screening Survey      Flowsheet Row Responses   Facility patient discharged from? [facility]   Attempt successful? No   Unsuccessful attempts Attempt 1              Shanon LI - Registered Nurse

## 2024-02-23 ENCOUNTER — MATERNAL SCREENING (OUTPATIENT)
Dept: CALL CENTER | Facility: HOSPITAL | Age: 34
End: 2024-02-23
Payer: OTHER GOVERNMENT

## 2024-02-23 NOTE — OUTREACH NOTE
Maternal Screening Survey      Flowsheet Row Responses   Facility patient discharged from? [facility]   Attempt successful? No   Unsuccessful attempts Attempt 3   Revoke Decline to participate              Shanon LI - Registered Nurse

## 2024-02-24 ENCOUNTER — TELEPHONE (OUTPATIENT)
Dept: LACTATION | Facility: HOSPITAL | Age: 34
End: 2024-02-24
Payer: OTHER GOVERNMENT

## 2024-10-04 DIAGNOSIS — Z30.02 NATURAL FAMILY PLANNING: Primary | ICD-10-CM

## 2025-01-14 ENCOUNTER — OFFICE VISIT (OUTPATIENT)
Dept: OBSTETRICS AND GYNECOLOGY | Facility: CLINIC | Age: 35
End: 2025-01-14
Payer: OTHER GOVERNMENT

## 2025-01-14 VITALS
WEIGHT: 156 LBS | BODY MASS INDEX: 25.07 KG/M2 | DIASTOLIC BLOOD PRESSURE: 88 MMHG | SYSTOLIC BLOOD PRESSURE: 128 MMHG | HEIGHT: 66 IN

## 2025-01-14 DIAGNOSIS — Z01.419 WELL WOMAN EXAM: Primary | ICD-10-CM

## 2025-01-14 DIAGNOSIS — Z30.09 FAMILY PLANNING: ICD-10-CM

## 2025-01-14 RX ORDER — LETROZOLE 2.5 MG/1
2.5 TABLET, FILM COATED ORAL DAILY
Qty: 5 TABLET | Refills: 1 | Status: SHIPPED | OUTPATIENT
Start: 2025-01-14

## 2025-01-14 NOTE — PROGRESS NOTES
"Well Woman Visit    CC: Scheduled annual well gyn visit  Chief Complaint   Patient presents with    Annual Exam         HPI:   34 y.o. who presents today for annual exam. Patient states periods are monthly, less than 7 days and not heavy in nature. Her and her partner are planning to conceive again and she did use femara with her last pregnancy. She would like to use this again.  She is sexually active with one male partner . She denies any H/O STDS.  She denies any pain with intercourse. She denies any family H/O breast, uterine or ovarian cancer. She denies any vaginal odor, vaginal discharge, dysuria/hematuria, F/C, D/C, N/V, CP or SOB. Patient reports that she is not currently experiencing any symptoms of urinary incontinence.      History: PMHx, Meds, Allergies, PSHx, Social Hx, and POBHx all reviewed and updated.    ROS:  Review of Systems - General ROS: negative  Psychological ROS: negative  Endocrine ROS: negative  Breast ROS: negative  Respiratory ROS: negative  Cardiovascular ROS: negative  Gastrointestinal ROS: negative  Genito-Urinary ROS: negative  Musculoskeletal ROS: negative  Neurological ROS: negative  Dermatological ROS: negative        PHYSICAL EXAM:      /88   Ht 167.6 cm (66\")   Wt 70.8 kg (156 lb)   LMP 2024 (Exact Date)   BMI 25.18 kg/m²  Not found.    General- NAD, alert and oriented, appropriate  Psych- Normal mood, good memory  Neck- No masses, no thyroid enlargement  CV- Regular rhythm, no murnurs  Resp- CTA to bases, no wheezes  Abdomen- Soft, non distended, non tender, no masses    Breast Exam: Breast self awareness counseled  Breast left-  Bilaterally symmetrical, no masses, non tender, no nipple discharge  Breast right- Bilaterally symmetrical, no masses, non tender, no nipple discharge    Pelvic Exam:   External genitalia- Normal female, no lesions  Urethra/meatus- Normal, no masses, non tender  Bladder- Normal, no masses, non tender  Vagina- Normal, no atrophy, " no lesions, no discharge.  Prolapse : none noted   Cvx- Normal, no lesions, no discharge, No cervical motion tenderness  Uterus- Normal size, shape & consistency.  Non tender, mobile.  Adnexa- No mass, non tender    Chaperone present for pelvic exam     Lymphatic- No palpable neck, axillary, or groin nodes  Ext- No edema, no cyanosis    Skin- No lesions, no rashes, no acanthosis nigricans      ASSESSMENT and PLAN:    Diagnoses and all orders for this visit:    1. Well woman exam (Primary)    2. Family planning  -     letrozole (Femara) 2.5 MG tablet; Take 1 tablet by mouth Daily. Take on days 3-7 of menstrual cycle  Dispense: 5 tablet; Refill: 1        Preventative:  1. Annuals every year; Cytology collections per prevailing guidelines.   2. Mammograms begin every year at 39 yo if no abnormalities are found and no family history.  3. Bone density studies begin at 66 yo. If no fracture history or osteoporosis family history.  4. Colonoscopy begins at 44 yo. Repeat every ten years if negative and no family history.  5. Calcium of 8872-1535 mg/day in split dosing  6. Vitamin D 400-800 IU/day  7. All other preventative health recommendations will be managed by the patients Primary care physician.    She understands the importance of having any ordered tests to be performed in a timely fashion.  The risks of not performing them include, but are not limited to, advanced cancer stages, bone loss from osteoporosis and/or subsequent increase in morbidity and/or mortality.  She is encouraged to review her results online and/or contact or office if she has questions.     Follow Up:  Return in about 1 year (around 1/14/2026) for Annual exam.    I spent 20 minutes on the separately reported service of Annual. This time is not included in the time used to support the E/M service also reported today.        Amparo Hogan, DO  01/14/2025    [facility]  [address] [place]  [address]  Dept: [phone]  Dept Fax: [phone]  Loc: [phone]  Loc Fax: [phone]

## 2025-04-28 DIAGNOSIS — Z30.09 FAMILY PLANNING: ICD-10-CM

## 2025-04-28 RX ORDER — LETROZOLE 2.5 MG/1
2.5 TABLET, FILM COATED ORAL DAILY
Qty: 5 TABLET | Refills: 1 | Status: SHIPPED | OUTPATIENT
Start: 2025-04-28

## 2025-04-28 NOTE — TELEPHONE ENCOUNTER
Pt requesting refill on Femara 2.5 mg. Last filled 1/14/2025 #5 with 1 refill. Last seen 1/14/2025, no upcoming appointment. Please advise.

## 2025-07-03 ENCOUNTER — PATIENT MESSAGE (OUTPATIENT)
Dept: OBSTETRICS AND GYNECOLOGY | Age: 35
End: 2025-07-03
Payer: OTHER GOVERNMENT

## 2025-07-03 DIAGNOSIS — Z32.00 POSSIBLE PREGNANCY, NOT CONFIRMED: Primary | ICD-10-CM

## 2025-07-10 ENCOUNTER — OFFICE VISIT (OUTPATIENT)
Dept: OBSTETRICS AND GYNECOLOGY | Age: 35
End: 2025-07-10
Payer: OTHER GOVERNMENT

## 2025-07-10 VITALS
WEIGHT: 157 LBS | SYSTOLIC BLOOD PRESSURE: 107 MMHG | DIASTOLIC BLOOD PRESSURE: 76 MMHG | BODY MASS INDEX: 25.23 KG/M2 | HEIGHT: 66 IN | HEART RATE: 91 BPM

## 2025-07-10 DIAGNOSIS — Z34.90 NORMAL INTRAUTERINE PREGNANCY, ANTEPARTUM: Primary | ICD-10-CM

## 2025-07-10 PROBLEM — O36.60X0 MACROSOMIA AFFECTING MANAGEMENT OF MOTHER: Status: RESOLVED | Noted: 2024-02-09 | Resolved: 2025-07-10

## 2025-07-10 PROBLEM — Z34.80 NORMAL PREGNANCY IN MULTIGRAVIDA: Status: ACTIVE | Noted: 2025-07-10

## 2025-07-10 PROBLEM — Z37.9 NORMAL LABOR: Status: RESOLVED | Noted: 2024-02-16 | Resolved: 2025-07-10

## 2025-07-10 PROBLEM — R73.09 ELEVATED GLUCOSE TOLERANCE TEST: Status: RESOLVED | Noted: 2023-11-20 | Resolved: 2025-07-10

## 2025-07-10 PROBLEM — Z34.80 SUPERVISION OF OTHER NORMAL PREGNANCY, ANTEPARTUM: Status: RESOLVED | Noted: 2023-08-22 | Resolved: 2025-07-10

## 2025-07-10 PROBLEM — O99.019 ANEMIA OF MOTHER IN PREGNANCY, ANTEPARTUM: Status: RESOLVED | Noted: 2023-11-20 | Resolved: 2025-07-10

## 2025-07-10 PROBLEM — R82.5 POSITIVE URINE DRUG SCREEN: Status: RESOLVED | Noted: 2023-08-25 | Resolved: 2025-07-10

## 2025-07-10 LAB
ABO GROUP BLD: NORMAL
AMPHET+METHAMPHET UR QL: NEGATIVE
AMPHETAMINES UR QL: NEGATIVE
B-HCG UR QL: POSITIVE
BARBITURATES UR QL SCN: NEGATIVE
BASOPHILS # BLD AUTO: 0.06 10*3/MM3 (ref 0–0.2)
BASOPHILS NFR BLD AUTO: 0.5 % (ref 0–1.5)
BENZODIAZ UR QL SCN: NEGATIVE
BLD GP AB SCN SERPL QL: NEGATIVE
BUPRENORPHINE SERPL-MCNC: NEGATIVE NG/ML
C TRACH DNA SPEC QL NAA+PROBE: NOT DETECTED
CANNABINOIDS SERPL QL: NEGATIVE
COCAINE UR QL: NEGATIVE
DEPRECATED RDW RBC AUTO: 39.9 FL (ref 37–54)
EOSINOPHIL # BLD AUTO: 0.21 10*3/MM3 (ref 0–0.4)
EOSINOPHIL NFR BLD AUTO: 1.8 % (ref 0.3–6.2)
ERYTHROCYTE [DISTWIDTH] IN BLOOD BY AUTOMATED COUNT: 12.4 % (ref 12.3–15.4)
EXPIRATION DATE: ABNORMAL
FENTANYL UR-MCNC: NEGATIVE NG/ML
HBA1C MFR BLD: 5 % (ref 4.8–5.6)
HBV SURFACE AG SERPL QL IA: NORMAL
HCT VFR BLD AUTO: 40 % (ref 34–46.6)
HCV AB SER QL: NORMAL
HGB BLD-MCNC: 13.4 G/DL (ref 12–15.9)
HIV 1+2 AB+HIV1 P24 AG SERPL QL IA: NORMAL
IMM GRANULOCYTES # BLD AUTO: 0.04 10*3/MM3 (ref 0–0.05)
IMM GRANULOCYTES NFR BLD AUTO: 0.3 % (ref 0–0.5)
INTERNAL NEGATIVE CONTROL: ABNORMAL
INTERNAL POSITIVE CONTROL: ABNORMAL
LYMPHOCYTES # BLD AUTO: 2.33 10*3/MM3 (ref 0.7–3.1)
LYMPHOCYTES NFR BLD AUTO: 20 % (ref 19.6–45.3)
Lab: ABNORMAL
MCH RBC QN AUTO: 29.6 PG (ref 26.6–33)
MCHC RBC AUTO-ENTMCNC: 33.5 G/DL (ref 31.5–35.7)
MCV RBC AUTO: 88.5 FL (ref 79–97)
METHADONE UR QL SCN: NEGATIVE
MONOCYTES # BLD AUTO: 0.85 10*3/MM3 (ref 0.1–0.9)
MONOCYTES NFR BLD AUTO: 7.3 % (ref 5–12)
N GONORRHOEA RRNA SPEC QL NAA+PROBE: NOT DETECTED
NEUTROPHILS NFR BLD AUTO: 70.1 % (ref 42.7–76)
NEUTROPHILS NFR BLD AUTO: 8.18 10*3/MM3 (ref 1.7–7)
NRBC BLD AUTO-RTO: 0 /100 WBC (ref 0–0.2)
OPIATES UR QL: NEGATIVE
OXYCODONE UR QL SCN: NEGATIVE
PCP UR QL SCN: NEGATIVE
PLATELET # BLD AUTO: 262 10*3/MM3 (ref 140–450)
PMV BLD AUTO: 11.7 FL (ref 6–12)
RBC # BLD AUTO: 4.52 10*6/MM3 (ref 3.77–5.28)
RH BLD: POSITIVE
TRICYCLICS UR QL SCN: NEGATIVE
WBC NRBC COR # BLD AUTO: 11.67 10*3/MM3 (ref 3.4–10.8)

## 2025-07-10 PROCEDURE — 80081 OBSTETRIC PANEL INC HIV TSTG: CPT | Performed by: OBSTETRICS & GYNECOLOGY

## 2025-07-10 PROCEDURE — 83036 HEMOGLOBIN GLYCOSYLATED A1C: CPT | Performed by: OBSTETRICS & GYNECOLOGY

## 2025-07-10 PROCEDURE — 87086 URINE CULTURE/COLONY COUNT: CPT | Performed by: OBSTETRICS & GYNECOLOGY

## 2025-07-10 PROCEDURE — 87591 N.GONORRHOEAE DNA AMP PROB: CPT | Performed by: OBSTETRICS & GYNECOLOGY

## 2025-07-10 PROCEDURE — 87491 CHLMYD TRACH DNA AMP PROBE: CPT | Performed by: OBSTETRICS & GYNECOLOGY

## 2025-07-10 PROCEDURE — 86803 HEPATITIS C AB TEST: CPT | Performed by: OBSTETRICS & GYNECOLOGY

## 2025-07-10 PROCEDURE — 80307 DRUG TEST PRSMV CHEM ANLYZR: CPT | Performed by: OBSTETRICS & GYNECOLOGY

## 2025-07-10 NOTE — PROGRESS NOTES
OBSTETRIC HISTORY AND PHYSICAL     Subjective:  Dominique Hicks is a 35 y.o.  at 10w0d  here for her new OB visit. Patient pregnancy is dated by Lmp, limited bedside US shows CRL around 10w6d. Patient's pregnancy is complicated by multiparity.  She is taking her prenatal vitamins.Reports no loss of fluid or vaginal bleeding.No hx of genetic, bleeding, endocrine, chromosome disorder in both patient and partner. No history of multiple gestations, congenital anomalies or mental retardation.    FOB involvement: yes   Pediatrician: yes   Breast/Bottle: breast   Epidural:yes  Discussed adequate water intake, food guidelines/weight gain, limit caffiene to less than 200mg daily.   Discussed food, activities to avoid. Discussed seatbelt safety.   Reviewed safe meds in pregnancy handout.  Taking PNV: yes   Smoking cessation needed: no     Reviewed and updated:  OBHx, GYNHx (STDs), PMHx, Medications, Allergies, PSHx, Social Hx, Preventative Hx (PAP), Hx of abuse/safe environment, Vaccine Hx including hx of chickenpox or vaccine, Genetic Hx (pt, FOB, both families).        OB History    Para Term  AB Living   5 3 3 0 1 3   SAB IAB Ectopic Molar Multiple Live Births   1 0 0 0 0 3      # Outcome Date GA Lbr Moise/2nd Weight Sex Type Anes PTL Lv   5 Current            4 Term 24 39w6d / 00:16 3740 g (8 lb 3.9 oz) F Vag-Spont None N LYN   3 Term 21 40w3d 28:40 / 00:09 4500 g (9 lb 14.7 oz) F Vag-Spont None N LYN   2 Term 20 40w0d  3629 g (8 lb) F Vag-Spont   LYN   1 SAB 2017 9w0d            Past Medical History:   Diagnosis Date    Anemia     Female infertility     Have cycles but not ovulating    Mastitis     Ovarian cyst      Past Surgical History:   Procedure Laterality Date    BREAST ABSCESS INCISION AND DRAINAGE       Family History   Problem Relation Age of Onset    Ovarian cancer Mother         Full hysterectomy in late 40s due to large tumors    Heart disease Mother      Thyroid disease Maternal Grandmother     Breast cancer Paternal Aunt 30        Sister passed in late thirties    Uterine cancer Neg Hx     Colon cancer Neg Hx     Prostate cancer Neg Hx     Pulmonary embolism Neg Hx     Deep vein thrombosis Neg Hx      No Known Allergies  Social History     Socioeconomic History    Marital status:    Tobacco Use    Smoking status: Never    Smokeless tobacco: Never   Vaping Use    Vaping status: Never Used   Substance and Sexual Activity    Alcohol use: Not Currently    Drug use: No     Comment: +meth on UDS 2023    Sexual activity: Yes     Partners: Male     Birth control/protection: None     Comment:            ROS:  General ROS: negative for - chills or fatigue  Respiratory ROS: negative for - cough or hemoptysis  Cardiovascular ROS: negative for - chest pain or dyspnea on exertion  Gastrointestinal ROS: negative for - abdominal pain or appetite loss  Musculoskeletal ROS: negative for - gait disturbance or joint pain  Neurological ROS: negative for - behavioral changes or bowel and bladder control changes  Dermatological ROS: negative for rashes or lesions     Objective:  Physical Exam:   Vitals:    07/10/25 1328   BP: 107/76   Pulse: 91       General appearance - alert, well appearing, and in no distress  Mental status - alert, oriented to person, place, and time  Neck- Supple.  No nodularity or enlargement.  Heart- Regular rate and rhythm without murmur, gallop or rub.  Lungs- Clear to auscultation bilaterally, negative W/R/R  Breasts- Deferred to annual  Abdomen- Soft, Gravid uterus, non-tender  Extremeties: Normal ROM, Negative swelling or cyanosis  Neurological: Gait normal, Negative tingling or loss of sensation     Counseling:   Nutrition discussed, calories, activity/exercise in pregnancy  Discussed dietary restrictions/safety food preparation in pregnancy  Reviewed what to expect prenatal visits, office providers (female and male) and [facility]  Hospitalists  Appropriate trimester precautions provided, N/V, vag bleeding, cramping  Questions answered  Discussed healthy weight gain.  Also discussed increased water intake, 200mg of caffeine daily, exercise and healthy eating habits.    Encouraged patient to consider breastfeeding. Discussed that it is recommended by the CDC and WHO that women exclusively breastfeed for the first 6 months and continue to breastfeed up to one year. Discussed the health benefits of breastfeeding, including increased immune systems in infants, reduced risk of food allergies, and reduced risk of chronic disease as an adult. Maternal benefits include more PP weight loss, reduced risk of PP depression, breast/ovarian cancer risk reduced.     ASSESSMENT/PLAN:   Diagnoses and all orders for this visit:    1. Normal intrauterine pregnancy, antepartum (Primary)  -     POC Pregnancy, Urine  -     US Ob < 14 Weeks Single or First Gestation; Future        Problems Addressed this Visit    None  Visit Diagnoses         Normal intrauterine pregnancy, antepartum    -  Primary    Relevant Orders    POC Pregnancy, Urine (Completed)    US Ob < 14 Weeks Single or First Gestation          Diagnoses         Codes Comments      Normal intrauterine pregnancy, antepartum    -  Primary ICD-10-CM: Z34.90  ICD-9-CM: V22.1                     Return in about 4 weeks (around 8/7/2025) for Routine OB visit.    We have gone over the expected prenatal care to include the timing and content of visits including the anatomy ultrasound.  We discussed the content of the anatomy ultrasound and its limitations (the fact that ultrasound in general may not see up to 40% of abnormalities).  I informed her how to contact the office and/or on call person in the event of any problems and encouraged her to do so when she feels it is necessary.  We then spent time answering her questions which she indicated were answered to her satisfaction.    Amparo Hogan, DO  7/10/2025  14:02 EDT

## 2025-07-11 LAB — RPR SER QL: NORMAL

## 2025-07-12 LAB
BACTERIA SPEC AEROBE CULT: NO GROWTH
RUBV IGG SERPL IA-ACNC: 1.12 INDEX

## 2025-08-07 ENCOUNTER — INITIAL PRENATAL (OUTPATIENT)
Dept: OBSTETRICS AND GYNECOLOGY | Age: 35
End: 2025-08-07
Payer: OTHER GOVERNMENT

## 2025-08-07 VITALS — DIASTOLIC BLOOD PRESSURE: 68 MMHG | BODY MASS INDEX: 25.02 KG/M2 | SYSTOLIC BLOOD PRESSURE: 107 MMHG | WEIGHT: 155 LBS

## 2025-08-07 DIAGNOSIS — G44.009 CLUSTER HEADACHE, NOT INTRACTABLE, UNSPECIFIED CHRONICITY PATTERN: ICD-10-CM

## 2025-08-07 DIAGNOSIS — Z34.80 SUPERVISION OF OTHER NORMAL PREGNANCY, ANTEPARTUM: Primary | ICD-10-CM

## 2025-08-07 LAB
GLUCOSE UR STRIP-MCNC: NEGATIVE MG/DL
PROT UR STRIP-MCNC: NEGATIVE MG/DL

## 2025-08-07 RX ORDER — PRENATAL VIT/IRON FUM/FOLIC AC 27MG-0.8MG
TABLET ORAL DAILY
COMMUNITY

## 2025-08-07 RX ORDER — MAGNESIUM OXIDE 400 MG/1
400 TABLET ORAL DAILY
Qty: 30 TABLET | Refills: 1 | Status: SHIPPED | OUTPATIENT
Start: 2025-08-07

## 2025-08-08 DIAGNOSIS — Z34.80 SUPERVISION OF OTHER NORMAL PREGNANCY, ANTEPARTUM: ICD-10-CM

## 2025-08-14 ENCOUNTER — TELEPHONE (OUTPATIENT)
Dept: OBSTETRICS AND GYNECOLOGY | Age: 35
End: 2025-08-14
Payer: OTHER GOVERNMENT